# Patient Record
Sex: MALE | Race: WHITE | NOT HISPANIC OR LATINO | Employment: FULL TIME | URBAN - NONMETROPOLITAN AREA
[De-identification: names, ages, dates, MRNs, and addresses within clinical notes are randomized per-mention and may not be internally consistent; named-entity substitution may affect disease eponyms.]

---

## 2021-03-29 ENCOUNTER — APPOINTMENT (OUTPATIENT)
Dept: GENERAL RADIOLOGY | Facility: HOSPITAL | Age: 36
End: 2021-03-29

## 2021-03-29 ENCOUNTER — HOSPITAL ENCOUNTER (EMERGENCY)
Facility: HOSPITAL | Age: 36
Discharge: HOME | End: 2021-03-29
Attending: EMERGENCY MEDICINE | Admitting: EMERGENCY MEDICINE

## 2021-03-29 ENCOUNTER — APPOINTMENT (OUTPATIENT)
Dept: CT IMAGING | Facility: HOSPITAL | Age: 36
End: 2021-03-29

## 2021-03-29 VITALS
HEART RATE: 78 BPM | SYSTOLIC BLOOD PRESSURE: 155 MMHG | RESPIRATION RATE: 22 BRPM | HEIGHT: 70 IN | DIASTOLIC BLOOD PRESSURE: 86 MMHG | WEIGHT: 165 LBS | OXYGEN SATURATION: 98 % | BODY MASS INDEX: 23.62 KG/M2 | TEMPERATURE: 95 F

## 2021-03-29 DIAGNOSIS — S92.901A CLOSED FRACTURE OF RIGHT FOOT, INITIAL ENCOUNTER: Primary | ICD-10-CM

## 2021-03-29 PROCEDURE — 73610 X-RAY EXAM OF ANKLE: CPT

## 2021-03-29 PROCEDURE — 73700 CT LOWER EXTREMITY W/O DYE: CPT

## 2021-03-29 PROCEDURE — 73630 X-RAY EXAM OF FOOT: CPT

## 2021-03-29 PROCEDURE — 25010000002 TDAP 5-2.5-18.5 LF-MCG/0.5 SUSPENSION: Performed by: STUDENT IN AN ORGANIZED HEALTH CARE EDUCATION/TRAINING PROGRAM

## 2021-03-29 PROCEDURE — 90715 TDAP VACCINE 7 YRS/> IM: CPT | Performed by: STUDENT IN AN ORGANIZED HEALTH CARE EDUCATION/TRAINING PROGRAM

## 2021-03-29 PROCEDURE — 73590 X-RAY EXAM OF LOWER LEG: CPT

## 2021-03-29 PROCEDURE — 99284 EMERGENCY DEPT VISIT MOD MDM: CPT

## 2021-03-29 PROCEDURE — 90471 IMMUNIZATION ADMIN: CPT | Performed by: STUDENT IN AN ORGANIZED HEALTH CARE EDUCATION/TRAINING PROGRAM

## 2021-03-29 RX ORDER — HYDROCODONE BITARTRATE AND ACETAMINOPHEN 5; 325 MG/1; MG/1
2 TABLET ORAL EVERY 6 HOURS PRN
Qty: 15 TABLET | Refills: 0 | Status: SHIPPED | OUTPATIENT
Start: 2021-03-29 | End: 2021-04-12

## 2021-03-29 RX ORDER — SODIUM CHLORIDE 0.9 % (FLUSH) 0.9 %
10 SYRINGE (ML) INJECTION AS NEEDED
Status: CANCELLED | OUTPATIENT
Start: 2021-03-29

## 2021-03-29 RX ORDER — HYDROCODONE BITARTRATE AND ACETAMINOPHEN 10; 325 MG/1; MG/1
1 TABLET ORAL EVERY 4 HOURS PRN
Status: DISCONTINUED | OUTPATIENT
Start: 2021-03-29 | End: 2021-03-29 | Stop reason: HOSPADM

## 2021-03-29 RX ORDER — KETOROLAC TROMETHAMINE 10 MG/1
20 TABLET, FILM COATED ORAL ONCE
Status: COMPLETED | OUTPATIENT
Start: 2021-03-29 | End: 2021-03-29

## 2021-03-29 RX ORDER — HYDROCODONE BITARTRATE AND ACETAMINOPHEN 7.5; 325 MG/1; MG/1
1 TABLET ORAL ONCE
Status: COMPLETED | OUTPATIENT
Start: 2021-03-29 | End: 2021-03-29

## 2021-03-29 RX ORDER — KETOROLAC TROMETHAMINE 10 MG/1
10 TABLET, FILM COATED ORAL EVERY 6 HOURS PRN
Qty: 15 TABLET | Refills: 0 | Status: SHIPPED | OUTPATIENT
Start: 2021-03-29 | End: 2021-04-12

## 2021-03-29 RX ADMIN — KETOROLAC TROMETHAMINE 20 MG: 10 TABLET, FILM COATED ORAL at 16:12

## 2021-03-29 RX ADMIN — TETANUS TOXOID, REDUCED DIPHTHERIA TOXOID AND ACELLULAR PERTUSSIS VACCINE, ADSORBED 0.5 ML: 5; 2.5; 8; 8; 2.5 SUSPENSION INTRAMUSCULAR at 14:12

## 2021-03-29 RX ADMIN — HYDROCODONE BITARTRATE AND ACETAMINOPHEN 1 TABLET: 7.5; 325 TABLET ORAL at 13:30

## 2021-03-31 ENCOUNTER — OFFICE VISIT (OUTPATIENT)
Dept: PODIATRY | Facility: CLINIC | Age: 36
End: 2021-03-31

## 2021-03-31 VITALS — HEART RATE: 121 BPM | BODY MASS INDEX: 23.62 KG/M2 | WEIGHT: 165 LBS | OXYGEN SATURATION: 99 % | HEIGHT: 70 IN

## 2021-03-31 DIAGNOSIS — S93.324A DISLOCATION OF TARSOMETATARSAL JOINT OF RIGHT FOOT, INITIAL ENCOUNTER: Primary | ICD-10-CM

## 2021-03-31 PROCEDURE — 99204 OFFICE O/P NEW MOD 45 MIN: CPT | Performed by: PODIATRIST

## 2021-04-02 RX ORDER — HYDROCODONE BITARTRATE AND ACETAMINOPHEN 10; 325 MG/1; MG/1
1 TABLET ORAL EVERY 6 HOURS PRN
Qty: 28 TABLET | Refills: 0 | Status: SHIPPED | OUTPATIENT
Start: 2021-04-02 | End: 2021-04-12

## 2021-04-05 ENCOUNTER — OFFICE VISIT (OUTPATIENT)
Dept: PODIATRY | Facility: CLINIC | Age: 36
End: 2021-04-05

## 2021-04-05 VITALS — WEIGHT: 165 LBS | OXYGEN SATURATION: 99 % | HEIGHT: 70 IN | HEART RATE: 102 BPM | BODY MASS INDEX: 23.62 KG/M2

## 2021-04-05 DIAGNOSIS — S93.324D DISLOCATION OF TARSOMETATARSAL JOINT OF RIGHT FOOT, SUBSEQUENT ENCOUNTER: Primary | ICD-10-CM

## 2021-04-05 PROCEDURE — 99214 OFFICE O/P EST MOD 30 MIN: CPT | Performed by: PODIATRIST

## 2021-04-05 NOTE — PROGRESS NOTES
Andres Wallace  1985  35 y.o. male    04/05/2021     Chief Complaint   Patient presents with   • Right Foot - Follow-up, Dislocation       History of Present Illness    Andres Wallace is a 35 y.o.male who presents to clinic as a follow-up of his right foot injury.  States swelling is improving.  Denies any new complaints.    History reviewed. No pertinent past medical history.      Past Surgical History:   Procedure Laterality Date   • WRIST SURGERY           Family History   Problem Relation Age of Onset   • Diabetes Mother    • Cancer Maternal Grandfather    • Cancer Paternal Grandfather    • Diabetes Paternal Grandfather        No Known Allergies    Social History     Socioeconomic History   • Marital status: Single     Spouse name: Not on file   • Number of children: Not on file   • Years of education: Not on file   • Highest education level: Not on file   Tobacco Use   • Smoking status: Current Every Day Smoker   • Smokeless tobacco: Current User     Types: Snuff   Vaping Use   • Vaping Use: Never used   Substance and Sexual Activity   • Alcohol use: Yes   • Drug use: Yes     Types: Nitrous oxide   • Sexual activity: Defer         Current Outpatient Medications   Medication Sig Dispense Refill   • HYDROcodone-acetaminophen (Norco)  MG per tablet Take 1 tablet by mouth Every 6 (Six) Hours As Needed for Moderate Pain . 28 tablet 0   • ketorolac (TORADOL) 10 MG tablet Take 1 tablet by mouth Every 6 (Six) Hours As Needed for Moderate Pain  for up to 15 doses. 15 tablet 0   • HYDROcodone-acetaminophen (NORCO) 5-325 MG per tablet Take 2 tablets by mouth Every 6 (Six) Hours As Needed for Moderate Pain . 15 tablet 0     No current facility-administered medications for this visit.       Review of Systems   Constitutional: Negative.    HENT: Positive for hearing loss.    Respiratory: Negative.    Cardiovascular: Negative.    Gastrointestinal: Negative.    Musculoskeletal:        Foot pain.   Skin:  "Positive for color change and wound.   Neurological: Negative.    Psychiatric/Behavioral: Negative.          OBJECTIVE    Pulse 102   Ht 177.8 cm (70\")   Wt 74.8 kg (165 lb)   SpO2 99%   BMI 23.68 kg/m²     Physical Exam  Vitals reviewed.   Constitutional:       General: He is not in acute distress.     Appearance: He is well-developed.   HENT:      Head: Normocephalic and atraumatic.      Nose: Nose normal.   Eyes:      Conjunctiva/sclera: Conjunctivae normal.      Pupils: Pupils are equal, round, and reactive to light.   Pulmonary:      Effort: Pulmonary effort is normal. No respiratory distress.      Breath sounds: No wheezing.   Musculoskeletal:         General: Swelling, tenderness and signs of injury present. No deformity. Normal range of motion.   Skin:     General: Skin is warm and dry.      Capillary Refill: Capillary refill takes less than 2 seconds.   Neurological:      Mental Status: He is alert and oriented to person, place, and time.   Psychiatric:         Behavior: Behavior normal.         Thought Content: Thought content normal.          Right Lower Extremity Exam:     Cardiovascular:    DP/PT pulses palpable    CFT brisk  to all digits    Skin temp is warm to warm from proximal tibia to distal digits    Pedal hair growth present    Edema and ecchymosis noted diffusely about the dorsal, medial ankle plantar forefoot, improving  Musculoskeletal:  Muscle strength is deferred  ROM of the 1st MTP is WNL b/l  Global pain on palpation to right midfoot  Active flexion and extension of distal digits  ROM of the ankle joint is  WNL    Dermatological:   Webspaces 1-4  are clean, dry and intact.   No subcutaneous nodules or masses noted     Ruptured  fracture blisters noted to dorsal midfoot.  Dorsal skin is  taut, - wrinkle test, improving   Neurological:   Protective sensation intact    Sensation intact to light touch        Foot/Ankle Exam        Procedures    Study Result    Narrative & Impression   "   PROCEDURE: CT LOWER EXTREMITY WITHOUT IV CONTRAST     TECHNIQUE: Multichannel computed tomography of the right foot and  ankle without IV contrast.  Coronal and sagittal reformatted images were also obtained to  improve fracture detection.     Contrast: None     This exam was performed using radiation doses that are as low as  reasonably achievable (ALARA).  This exam was performed according to our departmental dose  optimization program, which includes automated exposure control,  adjustment of the mA and/or KV according to patient size and/or  use of iterative reconstruction technique.     COMPARISON: Right foot radiographs dated 3/29/2021.     HISTORY: Foot trauma     FINDINGS  There is widening of the Lisfranc joint consistent with  ligamentous disruption. There are comminuted fractures of the  bases of the first through fourth metatarsals consistent with  unstable injury. There is a small linear avulsion fracture  fragment which appears to be arising from the medial aspect of  the cuboid.     IMPRESSION:  CONCLUSION:   There is widening of the Lisfranc joint consistent with  ligamentous disruption. There are comminuted fractures of the  bases of the first through fourth metatarsals consistent with  unstable injury. There is a small linear avulsion fracture  fragment which appears to be arising from the medial aspect of  the cuboid.     Electronically signed by:  Randall Marques MD  3/29/2021 4:04 PM CDT  Workstation: [id number]         ASSESSMENT AND PLAN    Diagnoses and all orders for this visit:    1. Dislocation of tarsometatarsal joint of right foot, subsequent encounter (Primary)  -     Case Request; Standing  -     ceFAZolin (ANCEF) 2 g in sodium chloride 0.9 % 100 mL IVPB  -     Case Request    Other orders  -     Follow Anesthesia Guidelines / Standing Orders; Standing  -     Verify NPO Status; Standing  -     Obtain informed consent (if not collected inpatient or PAT); Standing  -     Notify  Physician - Standard; Standing  -     Follow Anesthesia Guidelines / Standing Orders; Future          -Soft tissue envelope is improving.  Reapplied Manzo compression splint.  We will tentatively plan for open reduction and internal fixation of right Lisfranc fracture dislocation on April 15.  -Surgical plan is for open reduction and internal fixation of right Lisfranc fracture dislocation.  -Risks and benefits of the procedure including but not limited to postoperative infection, incisional dehiscence, numbness, swelling, residual pain and postoperative blood clot discussed in detail.  No guarantees were given or implied.  -Tentative date for the surgery April 15  -All questions were answered  -Recheck following surgery           This document has been electronically signed by Golden Maki DPM on April 9, 2021 09:52 CDT     4/9/2021  09:52 CDT

## 2021-04-06 ENCOUNTER — TELEPHONE (OUTPATIENT)
Dept: PODIATRY | Facility: CLINIC | Age: 36
End: 2021-04-06

## 2021-04-06 NOTE — TELEPHONE ENCOUNTER
PT REQUESTS A CALL BACK RE NEEDS A WORK SLIP FOR [employer] COMP.  CALL BACK # [phone].  THANK YOU.

## 2021-04-06 NOTE — TELEPHONE ENCOUNTER
Patient request  A work excuse that he is to remain out of work.  He will need one for each visit from 4/5/2021 going forward  Fax # [phone]

## 2021-04-06 NOTE — TELEPHONE ENCOUNTER
This has been taken care of; I also sent a statement that it is the patient's responsibility to request a note at each appointment and that per our office guidelines work notes are not to be faxed. This is the one and only time this will be allowed.  Sorry for the inconvenience.  SJ

## 2021-04-07 PROBLEM — S93.324A DISLOCATION OF TARSOMETATARSAL JOINT OF RIGHT FOOT: Status: ACTIVE | Noted: 2021-04-07

## 2021-04-07 RX ORDER — BUPIVACAINE HCL/0.9 % NACL/PF 0.1 %
2 PLASTIC BAG, INJECTION (ML) EPIDURAL ONCE
Status: CANCELLED | OUTPATIENT
Start: 2021-04-15 | End: 2021-04-07

## 2021-04-09 ENCOUNTER — TELEPHONE (OUTPATIENT)
Dept: PODIATRY | Facility: CLINIC | Age: 36
End: 2021-04-09

## 2021-04-09 DIAGNOSIS — S93.324D DISLOCATION OF TARSOMETATARSAL JOINT OF RIGHT FOOT, SUBSEQUENT ENCOUNTER: Primary | ICD-10-CM

## 2021-04-09 RX ORDER — HYDROCODONE BITARTRATE AND ACETAMINOPHEN 10; 325 MG/1; MG/1
1 TABLET ORAL EVERY 6 HOURS PRN
Qty: 28 TABLET | Refills: 0 | Status: SHIPPED | OUTPATIENT
Start: 2021-04-09 | End: 2021-04-15 | Stop reason: HOSPADM

## 2021-04-09 NOTE — TELEPHONE ENCOUNTER
Andres Wallace     Last seen in office : 4/5/2021    Dislocation tarsometatarsal joint right foot     Surgery Day 4/15/2021    Last medication Norco 4/2/2021

## 2021-04-12 ENCOUNTER — LAB (OUTPATIENT)
Dept: LAB | Facility: HOSPITAL | Age: 36
End: 2021-04-12

## 2021-04-12 ENCOUNTER — PRE-ADMISSION TESTING (OUTPATIENT)
Dept: PREADMISSION TESTING | Facility: HOSPITAL | Age: 36
End: 2021-04-12

## 2021-04-12 ENCOUNTER — OFFICE VISIT (OUTPATIENT)
Dept: PODIATRY | Facility: CLINIC | Age: 36
End: 2021-04-12

## 2021-04-12 VITALS — BODY MASS INDEX: 22.19 KG/M2 | HEART RATE: 114 BPM | OXYGEN SATURATION: 98 % | WEIGHT: 155 LBS | HEIGHT: 70 IN

## 2021-04-12 VITALS
HEART RATE: 102 BPM | RESPIRATION RATE: 18 BRPM | OXYGEN SATURATION: 97 % | BODY MASS INDEX: 22.19 KG/M2 | WEIGHT: 155 LBS | DIASTOLIC BLOOD PRESSURE: 82 MMHG | HEIGHT: 70 IN | SYSTOLIC BLOOD PRESSURE: 150 MMHG

## 2021-04-12 DIAGNOSIS — S93.324D DISLOCATION OF TARSOMETATARSAL JOINT OF RIGHT FOOT, SUBSEQUENT ENCOUNTER: Primary | ICD-10-CM

## 2021-04-12 DIAGNOSIS — Z01.818 PREOP TESTING: Primary | ICD-10-CM

## 2021-04-12 LAB
MRSA DNA SPEC QL NAA+PROBE: NEGATIVE
SARS-COV-2 N GENE RESP QL NAA+PROBE: NOT DETECTED

## 2021-04-12 PROCEDURE — C9803 HOPD COVID-19 SPEC COLLECT: HCPCS

## 2021-04-12 PROCEDURE — 87635 SARS-COV-2 COVID-19 AMP PRB: CPT

## 2021-04-12 PROCEDURE — 99212 OFFICE O/P EST SF 10 MIN: CPT | Performed by: PODIATRIST

## 2021-04-12 PROCEDURE — 87641 MR-STAPH DNA AMP PROBE: CPT

## 2021-04-12 RX ORDER — SODIUM CHLORIDE, SODIUM GLUCONATE, SODIUM ACETATE, POTASSIUM CHLORIDE AND MAGNESIUM CHLORIDE 526; 502; 368; 37; 30 MG/100ML; MG/100ML; MG/100ML; MG/100ML; MG/100ML
1000 INJECTION, SOLUTION INTRAVENOUS CONTINUOUS PRN
Status: CANCELLED | OUTPATIENT
Start: 2021-04-15

## 2021-04-12 NOTE — PROGRESS NOTES
"Andres Wallace  1985  35 y.o. male     04/12/2021     Chief Complaint   Patient presents with   • Right Foot - Follow-up, Pain       History of Present Illness    Andres Wallace is a 35 y.o.male who presents to clinic as a follow-up of his right foot injury.     History reviewed. No pertinent past medical history.      Past Surgical History:   Procedure Laterality Date   • ORIF WRIST FRACTURE Left    • OTHER SURGICAL HISTORY      gastric surgery secondary to perforated ulcer         Family History   Problem Relation Age of Onset   • Diabetes Mother    • Cancer Maternal Grandfather    • Cancer Paternal Grandfather    • Diabetes Paternal Grandfather        No Known Allergies    Social History     Socioeconomic History   • Marital status:      Spouse name: Not on file   • Number of children: Not on file   • Years of education: Not on file   • Highest education level: Not on file   Tobacco Use   • Smoking status: Current Every Day Smoker   • Smokeless tobacco: Current User     Types: Snuff   Vaping Use   • Vaping Use: Never used   Substance and Sexual Activity   • Alcohol use: Yes     Comment: socially   • Drug use: Not Currently     Types: Nitrous oxide   • Sexual activity: Defer         Current Outpatient Medications   Medication Sig Dispense Refill   • HYDROcodone-acetaminophen (Norco)  MG per tablet Take 1 tablet by mouth Every 6 (Six) Hours As Needed for Moderate Pain . 28 tablet 0     No current facility-administered medications for this visit.       Review of Systems   Constitutional: Negative.    HENT: Positive for hearing loss.    Respiratory: Negative.    Cardiovascular: Negative.    Gastrointestinal: Negative.    Endocrine: Negative.    Genitourinary: Negative.    Musculoskeletal:        Right foot pain     Skin: Positive for color change and wound.   Neurological: Negative.    Psychiatric/Behavioral: Negative.          OBJECTIVE    Pulse 114   Ht 177.8 cm (70\")   Wt 70.3 kg " (155 lb)   SpO2 98%   BMI 22.24 kg/m²     Physical Exam  Vitals reviewed.   Constitutional:       General: He is not in acute distress.     Appearance: He is well-developed.   HENT:      Head: Normocephalic and atraumatic.      Nose: Nose normal.   Eyes:      Conjunctiva/sclera: Conjunctivae normal.      Pupils: Pupils are equal, round, and reactive to light.   Pulmonary:      Effort: Pulmonary effort is normal. No respiratory distress.      Breath sounds: No wheezing.   Musculoskeletal:         General: Swelling, tenderness and signs of injury present. No deformity. Normal range of motion.   Skin:     General: Skin is warm and dry.      Capillary Refill: Capillary refill takes less than 2 seconds.   Neurological:      Mental Status: He is alert and oriented to person, place, and time.   Psychiatric:         Behavior: Behavior normal.         Thought Content: Thought content normal.          Right Lower Extremity Exam:     Cardiovascular:    DP/PT pulses palpable    CFT brisk  to all digits    Skin temp is warm to warm from proximal tibia to distal digits    Pedal hair growth present    Edema and ecchymosis noted diffusely about the dorsal, medial ankle plantar forefoot, improving  Musculoskeletal:  Muscle strength is deferred  ROM of the 1st MTP is WNL b/l  Global pain on palpation to right midfoot  Active flexion and extension of distal digits  ROM of the ankle joint is  WNL    Dermatological:   Webspaces 1-4  are clean, dry and intact.   No subcutaneous nodules or masses noted     Ruptured  fracture blisters noted to dorsal midfoot.  Dorsal skin is  taut, - wrinkle test, improving   Neurological:   Protective sensation intact    Sensation intact to light touch        Foot/Ankle Exam        Procedures    Study Result    Narrative & Impression   PROCEDURE: CT LOWER EXTREMITY WITHOUT IV CONTRAST     TECHNIQUE: Multichannel computed tomography of the right foot and  ankle without IV contrast.  Coronal and sagittal  reformatted images were also obtained to  improve fracture detection.     Contrast: None     This exam was performed using radiation doses that are as low as  reasonably achievable (ALARA).  This exam was performed according to our departmental dose  optimization program, which includes automated exposure control,  adjustment of the mA and/or KV according to patient size and/or  use of iterative reconstruction technique.     COMPARISON: Right foot radiographs dated 3/29/2021.     HISTORY: Foot trauma     FINDINGS  There is widening of the Lisfranc joint consistent with  ligamentous disruption. There are comminuted fractures of the  bases of the first through fourth metatarsals consistent with  unstable injury. There is a small linear avulsion fracture  fragment which appears to be arising from the medial aspect of  the cuboid.     IMPRESSION:  CONCLUSION:   There is widening of the Lisfranc joint consistent with  ligamentous disruption. There are comminuted fractures of the  bases of the first through fourth metatarsals consistent with  unstable injury. There is a small linear avulsion fracture  fragment which appears to be arising from the medial aspect of  the cuboid.     Electronically signed by:  Randall Marques MD  3/29/2021 4:04 PM CDT  Workstation: [id number]         ASSESSMENT AND PLAN    Diagnoses and all orders for this visit:    1. Dislocation of tarsometatarsal joint of right foot, subsequent encounter (Primary)          -Soft tissue envelope has a significantly improved.  Unna boot applied today  -Tentative plan for open reduction internal fixation of right foot Lisfranc injury on April 15.  Risks and benefits of the surgery were discussed in detail.  No guarantees were given or implied.  -All questions were answered  -Recheck following surgery           This document has been electronically signed by Golden Maki DPM on April 12, 2021 12:10 CDT     4/12/2021  12:10 CDT

## 2021-04-12 NOTE — DISCHARGE INSTRUCTIONS
[facility]  Pre-op Information and Guidelines    You will be called after 2 p.m. the day before your surgery (Friday for Monday surgery) and notified of your time for arrival and approximate surgery time.  If you have not received a call by 4P.M., please contact Same Day Surgery at [phone] of if outside [facility] call [phone].    Please Follow these Important Safety Guidelines:    • The morning of your procedure, take only the medications listed below with   A sip of water:_____________________________________________       ______________________________________________    • DO NOT eat or drink anything after 12:00 midnight the night before surgery  Specific instructions concerning drinking clear liquids will be discussed during  the pre-surgery instruction call the day before your surgery.    • If you take a blood thinner (ex. Plavix, Coumadin, aspirin), ask your doctor when to stop it before surgery  STOP DATE: _________________    • Only 2 visitors are allowed in patient rooms at a time  Your visitors will be asked to wait in the lobby until the admission process is complete with the exception of a parent with a child and patients in need of special assistance.    • YOU CANNOT DRIVE YOURSELF HOME  You must be accompanied by someone who will be responsible for driving you home after surgery and for your care at home.    • DO NOT chew gum, use breath mints, hard candy, or smoke the day of surgery  • DO NOT drink alcohol for at least 24 hours before your surgery  • DO NOT wear any jewelry and remove all body piercing before coming to the hospital  • DO NOT wear make-up to the hospital  • If you are having surgery on an extremity (arm/leg/foot) remove nail polish/artificial nails on the surgical side  • Clothing, glasses, contacts, dentures, and hairpieces must be removed before surgery  • Bathe the night before or the morning of your surgery and do not use powders/lotions on  skin.

## 2021-04-14 ENCOUNTER — ANESTHESIA EVENT (OUTPATIENT)
Dept: PERIOP | Facility: HOSPITAL | Age: 36
End: 2021-04-14

## 2021-04-15 ENCOUNTER — ANESTHESIA (OUTPATIENT)
Dept: PERIOP | Facility: HOSPITAL | Age: 36
End: 2021-04-15

## 2021-04-15 ENCOUNTER — HOSPITAL ENCOUNTER (OUTPATIENT)
Facility: HOSPITAL | Age: 36
Setting detail: HOSPITAL OUTPATIENT SURGERY
Discharge: HOME | End: 2021-04-15
Attending: PODIATRIST | Admitting: PODIATRIST

## 2021-04-15 ENCOUNTER — APPOINTMENT (OUTPATIENT)
Dept: GENERAL RADIOLOGY | Facility: HOSPITAL | Age: 36
End: 2021-04-15

## 2021-04-15 VITALS
HEART RATE: 81 BPM | OXYGEN SATURATION: 99 % | DIASTOLIC BLOOD PRESSURE: 76 MMHG | RESPIRATION RATE: 18 BRPM | BODY MASS INDEX: 21.87 KG/M2 | HEIGHT: 70 IN | SYSTOLIC BLOOD PRESSURE: 133 MMHG | WEIGHT: 152.78 LBS | TEMPERATURE: 97.3 F

## 2021-04-15 DIAGNOSIS — S93.324D DISLOCATION OF TARSOMETATARSAL JOINT OF RIGHT FOOT, SUBSEQUENT ENCOUNTER: ICD-10-CM

## 2021-04-15 LAB
AMPHET+METHAMPHET UR QL: NEGATIVE
AMPHETAMINES UR QL: NEGATIVE
BARBITURATES UR QL SCN: NEGATIVE
BENZODIAZ UR QL SCN: NEGATIVE
BUPRENORPHINE SERPL-MCNC: NEGATIVE NG/ML
CANNABINOIDS SERPL QL: NEGATIVE
COCAINE UR QL: NEGATIVE
METHADONE UR QL SCN: NEGATIVE
OPIATES UR QL: POSITIVE
OXYCODONE UR QL SCN: NEGATIVE
PCP UR QL SCN: NEGATIVE
PROPOXYPH UR QL: NEGATIVE
TRICYCLICS UR QL SCN: NEGATIVE

## 2021-04-15 PROCEDURE — 76000 FLUOROSCOPY <1 HR PHYS/QHP: CPT

## 2021-04-15 PROCEDURE — 25010000002 CEFAZOLIN PER 500 MG: Performed by: PODIATRIST

## 2021-04-15 PROCEDURE — 25010000002 FENTANYL CITRATE (PF) 100 MCG/2ML SOLUTION: Performed by: NURSE ANESTHETIST, CERTIFIED REGISTERED

## 2021-04-15 PROCEDURE — 28485 OPTX METATARSAL FX EACH: CPT | Performed by: PODIATRIST

## 2021-04-15 PROCEDURE — C1713 ANCHOR/SCREW BN/BN,TIS/BN: HCPCS | Performed by: PODIATRIST

## 2021-04-15 PROCEDURE — 25010000002 PROPOFOL 10 MG/ML EMULSION: Performed by: NURSE ANESTHETIST, CERTIFIED REGISTERED

## 2021-04-15 PROCEDURE — 25010000002 MIDAZOLAM PER 1 MG: Performed by: NURSE ANESTHETIST, CERTIFIED REGISTERED

## 2021-04-15 PROCEDURE — 80306 DRUG TEST PRSMV INSTRMNT: CPT | Performed by: ANESTHESIOLOGY

## 2021-04-15 PROCEDURE — 25010000002 ONDANSETRON PER 1 MG: Performed by: NURSE ANESTHETIST, CERTIFIED REGISTERED

## 2021-04-15 PROCEDURE — 28615 REPAIR FOOT DISLOCATION: CPT | Performed by: PODIATRIST

## 2021-04-15 PROCEDURE — 25010000002 HYDROMORPHONE PER 4 MG: Performed by: NURSE ANESTHETIST, CERTIFIED REGISTERED

## 2021-04-15 PROCEDURE — 28555 REPAIR FOOT DISLOCATION: CPT | Performed by: PODIATRIST

## 2021-04-15 PROCEDURE — C1769 GUIDE WIRE: HCPCS | Performed by: PODIATRIST

## 2021-04-15 PROCEDURE — 25010000002 DEXAMETHASONE PER 1 MG: Performed by: NURSE ANESTHETIST, CERTIFIED REGISTERED

## 2021-04-15 PROCEDURE — 25010000002 ROPIVACAINE PER 1 MG: Performed by: PODIATRIST

## 2021-04-15 DEVICE — SCRW CANN SHRT THRD 1/3 4X32MM: Type: IMPLANTABLE DEVICE | Site: FOOT | Status: FUNCTIONAL

## 2021-04-15 DEVICE — IMPLANTABLE DEVICE: Type: IMPLANTABLE DEVICE | Site: FOOT | Status: FUNCTIONAL

## 2021-04-15 DEVICE — SCRW CANN SHRT THRD 1/3 4X40MM: Type: IMPLANTABLE DEVICE | Site: FOOT | Status: FUNCTIONAL

## 2021-04-15 RX ORDER — DEXAMETHASONE SODIUM PHOSPHATE 4 MG/ML
INJECTION, SOLUTION INTRA-ARTICULAR; INTRALESIONAL; INTRAMUSCULAR; INTRAVENOUS; SOFT TISSUE AS NEEDED
Status: DISCONTINUED | OUTPATIENT
Start: 2021-04-15 | End: 2021-04-15 | Stop reason: SURG

## 2021-04-15 RX ORDER — FENTANYL CITRATE 50 UG/ML
INJECTION, SOLUTION INTRAMUSCULAR; INTRAVENOUS AS NEEDED
Status: DISCONTINUED | OUTPATIENT
Start: 2021-04-15 | End: 2021-04-15 | Stop reason: SURG

## 2021-04-15 RX ORDER — SODIUM CHLORIDE, SODIUM GLUCONATE, SODIUM ACETATE, POTASSIUM CHLORIDE AND MAGNESIUM CHLORIDE 526; 502; 368; 37; 30 MG/100ML; MG/100ML; MG/100ML; MG/100ML; MG/100ML
1000 INJECTION, SOLUTION INTRAVENOUS CONTINUOUS PRN
Status: DISCONTINUED | OUTPATIENT
Start: 2021-04-15 | End: 2021-04-15 | Stop reason: HOSPADM

## 2021-04-15 RX ORDER — ROPIVACAINE HYDROCHLORIDE 5 MG/ML
INJECTION, SOLUTION EPIDURAL; INFILTRATION; PERINEURAL
Status: COMPLETED | OUTPATIENT
Start: 2021-04-15 | End: 2021-04-15

## 2021-04-15 RX ORDER — OXYCODONE AND ACETAMINOPHEN 7.5; 325 MG/1; MG/1
1 TABLET ORAL EVERY 4 HOURS PRN
Qty: 30 TABLET | Refills: 0 | Status: SHIPPED | OUTPATIENT
Start: 2021-04-15 | End: 2021-04-19 | Stop reason: SDUPTHER

## 2021-04-15 RX ORDER — BUPIVACAINE HCL/0.9 % NACL/PF 0.1 %
2 PLASTIC BAG, INJECTION (ML) EPIDURAL ONCE
Status: COMPLETED | OUTPATIENT
Start: 2021-04-15 | End: 2021-04-15

## 2021-04-15 RX ORDER — BACITRACIN ZINC 500 [USP'U]/G
OINTMENT TOPICAL AS NEEDED
Status: DISCONTINUED | OUTPATIENT
Start: 2021-04-15 | End: 2021-04-15 | Stop reason: HOSPADM

## 2021-04-15 RX ORDER — ONDANSETRON 2 MG/ML
INJECTION INTRAMUSCULAR; INTRAVENOUS AS NEEDED
Status: DISCONTINUED | OUTPATIENT
Start: 2021-04-15 | End: 2021-04-15 | Stop reason: SURG

## 2021-04-15 RX ORDER — HYDROMORPHONE HCL 110MG/55ML
PATIENT CONTROLLED ANALGESIA SYRINGE INTRAVENOUS AS NEEDED
Status: DISCONTINUED | OUTPATIENT
Start: 2021-04-15 | End: 2021-04-15 | Stop reason: SURG

## 2021-04-15 RX ORDER — PROPOFOL 10 MG/ML
VIAL (ML) INTRAVENOUS AS NEEDED
Status: DISCONTINUED | OUTPATIENT
Start: 2021-04-15 | End: 2021-04-15 | Stop reason: SURG

## 2021-04-15 RX ORDER — MEPERIDINE HYDROCHLORIDE 25 MG/ML
12.5 INJECTION INTRAMUSCULAR; INTRAVENOUS; SUBCUTANEOUS
Status: DISCONTINUED | OUTPATIENT
Start: 2021-04-15 | End: 2021-04-15 | Stop reason: HOSPADM

## 2021-04-15 RX ORDER — MIDAZOLAM HYDROCHLORIDE 1 MG/ML
INJECTION INTRAMUSCULAR; INTRAVENOUS AS NEEDED
Status: DISCONTINUED | OUTPATIENT
Start: 2021-04-15 | End: 2021-04-15 | Stop reason: SURG

## 2021-04-15 RX ORDER — LIDOCAINE HYDROCHLORIDE 20 MG/ML
INJECTION, SOLUTION INFILTRATION; PERINEURAL AS NEEDED
Status: DISCONTINUED | OUTPATIENT
Start: 2021-04-15 | End: 2021-04-15 | Stop reason: SURG

## 2021-04-15 RX ADMIN — FENTANYL CITRATE 100 MCG: 50 INJECTION INTRAMUSCULAR; INTRAVENOUS at 13:01

## 2021-04-15 RX ADMIN — ROPIVACAINE HYDROCHLORIDE 15 ML: 5 INJECTION, SOLUTION EPIDURAL; INFILTRATION; PERINEURAL at 12:08

## 2021-04-15 RX ADMIN — ROPIVACAINE HYDROCHLORIDE 15 ML: 5 INJECTION, SOLUTION EPIDURAL; INFILTRATION; PERINEURAL at 11:57

## 2021-04-15 RX ADMIN — ONDANSETRON HYDROCHLORIDE 4 MG: 2 INJECTION INTRAMUSCULAR; INTRAVENOUS at 15:03

## 2021-04-15 RX ADMIN — Medication 2 G: at 13:08

## 2021-04-15 RX ADMIN — PROPOFOL 200 MG: 10 INJECTION, EMULSION INTRAVENOUS at 13:01

## 2021-04-15 RX ADMIN — LIDOCAINE HYDROCHLORIDE 80 MG: 20 INJECTION, SOLUTION INFILTRATION; PERINEURAL at 13:01

## 2021-04-15 RX ADMIN — SODIUM CHLORIDE, SODIUM GLUCONATE, SODIUM ACETATE, POTASSIUM CHLORIDE AND MAGNESIUM CHLORIDE 1000 ML: 526; 502; 368; 37; 30 INJECTION, SOLUTION INTRAVENOUS at 09:40

## 2021-04-15 RX ADMIN — DEXAMETHASONE SODIUM PHOSPHATE 4 MG: 4 INJECTION, SOLUTION INTRAMUSCULAR; INTRAVENOUS at 13:16

## 2021-04-15 RX ADMIN — HYDROMORPHONE HYDROCHLORIDE 1 MG: 2 INJECTION, SOLUTION INTRAMUSCULAR; INTRAVENOUS; SUBCUTANEOUS at 15:04

## 2021-04-15 RX ADMIN — SODIUM CHLORIDE, SODIUM GLUCONATE, SODIUM ACETATE, POTASSIUM CHLORIDE AND MAGNESIUM CHLORIDE: 526; 502; 368; 37; 30 INJECTION, SOLUTION INTRAVENOUS at 13:20

## 2021-04-15 RX ADMIN — MIDAZOLAM HYDROCHLORIDE 2 MG: 2 INJECTION, SOLUTION INTRAMUSCULAR; INTRAVENOUS at 12:55

## 2021-04-15 NOTE — ANESTHESIA PREPROCEDURE EVALUATION
Anesthesia Evaluation     Patient summary reviewed and Nursing notes reviewed   no history of anesthetic complications:  NPO Solid Status: > 8 hours  NPO Liquid Status: > 8 hours           Airway   Mallampati: II  TM distance: >3 FB  Neck ROM: full  possible difficult intubation  Comment: Full trimmed beard.  Dental    (+) poor dentation    Comment: Right upper central incisor crown.    Pulmonary - normal exam    breath sounds clear to auscultation  (+) a smoker (1 ppd) Current Abstained day of surgery,   (-) COPD, asthma, sleep apnea  Cardiovascular - negative cardio ROS and normal exam  Exercise tolerance: good (4-7 METS)    Rhythm: regular  Rate: normal    (-) hypertension, valvular problems/murmurs, angina, murmur, DVT, hyperlipidemia      Neuro/Psych  (+) numbness (some tingling in the right foot),     (-) seizures, TIA, CVA, headaches, psychiatric history  GI/Hepatic/Renal/Endo    (-) GERD, hepatitis, liver disease, no renal disease, diabetes    Musculoskeletal     (+) radiculopathy (sciatica on right) Right lower extremity      ROS comment: Work related injury to his right foot.  Abdominal    Substance History   (+) alcohol use (socially),   (-) drug use     OB/GYN negative ob/gyn ROS         Other   arthritis (wrist),      (-) history of cancer  ROS/Med Hx Other: Pain 6/10                Anesthesia Plan    ASA 2     general with block   (Discussed peripheral nerve block(popliteal and adductor canal) for post op pain relief and patient understands possible complications,risks and agrees.)  intravenous induction     Anesthetic plan, all risks, benefits, and alternatives have been provided, discussed and informed consent has been obtained with: patient and mother.

## 2021-04-15 NOTE — ANESTHESIA PROCEDURE NOTES
Peripheral Block      Patient reassessed immediately prior to procedure    Patient location during procedure: pre-op  Start time: 4/15/2021 11:51 AM  Stop time: 4/15/2021 11:58 AM  Performed by  CRNA: Tiffanie Silver SRNA  Assisted by: Noam Munoz MD  Preanesthetic Checklist  Completed: patient identified, IV checked, site marked, risks and benefits discussed, surgical consent, monitors and equipment checked, pre-op evaluation and timeout performed  Prep:  Pt Position: supine  Sterile barriers:cap, gloves, mask and washed/disinfected hands  Prep: ChloraPrep  Patient monitoring: continuous pulse oximetry and blood pressure monitoring  Procedure  Sedation:no  Performed under: local infiltration  Guidance:ultrasound guided  ULTRASOUND INTERPRETATION.  Using ultrasound guidance a 21 G gauge needle was placed in close proximity to the femoral nerve, at which point, under ultrasound guidance anesthetic was injected in the area of the nerve and spread of the anesthesia was seen on ultrasound in close proximity thereto.  There were no abnormalities seen on ultrasound; a digital image was taken; and the patient tolerated the procedure with no complications. Images:still images obtained, printed/placed on chart    Laterality:right  Block Type:adductor canal block  Injection Technique:single-shot  Needle Type:echogenic  Needle Gauge:21 G      Medications Used: ropivacaine (NAROPIN) 0.5 % injection, 15 mL  Med admintered at 4/15/2021 11:57 AM      Medications  Comment:Pt identified, ultrasound guided, needle seen throughout, local infiltration appropriate    Post Assessment  Injection Assessment: negative aspiration for heme, no paresthesia on injection and incremental injection  Patient Tolerance:comfortable throughout block  Complications:no

## 2021-04-15 NOTE — ANESTHESIA PROCEDURE NOTES
Airway  Urgency: elective    Date/Time: 4/15/2021 1:02 PM  Airway not difficult    General Information and Staff    Patient location during procedure: OR  CRNA: Viral Jacobo CRNA    Indications and Patient Condition  Indications for airway management: airway protection    Preoxygenated: yes  Mask difficulty assessment: 1 - vent by mask    Final Airway Details  Final airway type: supraglottic airway      Successful airway: I-gel  Size 4    Cormack-Lehane Classification: grade IIa - partial view of glottis  Number of attempts at approach: 1  Assessment: lips, teeth, and gum same as pre-op    Additional Comments  Teeth checked after intubation.  No damage noted.

## 2021-04-15 NOTE — ANESTHESIA PROCEDURE NOTES
Peripheral Block      Patient reassessed immediately prior to procedure    Patient location during procedure: holding area  Start time: 4/15/2021 12:00 PM  Stop time: 4/15/2021 12:09 PM  Reason for block: at surgeon's request and post-op pain management  Performed by  CRNA: Tiffanie Silver SRNA  Assisted by: Noam Munoz MD  Preanesthetic Checklist  Completed: patient identified, IV checked, site marked, risks and benefits discussed, surgical consent, monitors and equipment checked, pre-op evaluation and timeout performed  Prep:  Pt Position: left lateral decubitus  Sterile barriers:cap, gloves, mask and washed/disinfected hands  Prep: ChloraPrep  Patient monitoring: blood pressure monitoring and continuous pulse oximetry  Procedure  Sedation:no  Performed under: local infiltration  Guidance:ultrasound guided  ULTRASOUND INTERPRETATION.  Using ultrasound guidance a 21 G gauge needle was placed in close proximity to the femoral nerve, at which point, under ultrasound guidance anesthetic was injected in the area of the nerve and spread of the anesthesia was seen on ultrasound in close proximity thereto.  There were no abnormalities seen on ultrasound; a digital image was taken; and the patient tolerated the procedure with no complications. Images:still images obtained, printed/placed on chart    Laterality:right  Block Type:popliteal  Injection Technique:single-shot  Needle Type:echogenic  Needle Gauge:21 G      Medications Used: ropivacaine (NAROPIN) 0.5 % injection, 15 mL  Med admintered at 4/15/2021 12:08 PM      Medications  Comment:Pt identified, ultrasound guided, needle seen throughout, local infiltrate appropriate    Post Assessment  Injection Assessment: negative aspiration for heme, no paresthesia on injection and incremental injection  Patient Tolerance:comfortable throughout block  Complications:no

## 2021-04-15 NOTE — ANESTHESIA POSTPROCEDURE EVALUATION
Patient: Andres Wallace    Procedure Summary     Date: 04/15/21 Room / Location: [facility] OR  / [facility] OR    Anesthesia Start: 1255 Anesthesia Stop: 1509    Procedure: OPEN REDUCTION AND INTERNAL FIXATION OF RIGHT LISFRANC FRACTURE DISLOCATION (Right ) Diagnosis:       Dislocation of tarsometatarsal joint of right foot, subsequent encounter      (Dislocation of tarsometatarsal joint of right foot, subsequent encounter [S93.324D])    Surgeons: Golden Maki DPM Provider: Noam Munoz MD    Anesthesia Type: general ASA Status: 2          Anesthesia Type: general    Vitals  No vitals data found for the desired time range.          Post Anesthesia Care and Evaluation    Patient location during evaluation: bedside  Patient participation: complete - patient cannot participate  Level of consciousness: awake  Pain score: 0  Pain management: adequate  Airway patency: patent  Anesthetic complications: No anesthetic complications  PONV Status: none  Cardiovascular status: acceptable  Respiratory status: acceptable  Hydration status: acceptable

## 2021-04-19 ENCOUNTER — OFFICE VISIT (OUTPATIENT)
Dept: PODIATRY | Facility: CLINIC | Age: 36
End: 2021-04-19

## 2021-04-19 VITALS — WEIGHT: 152 LBS | HEART RATE: 113 BPM | HEIGHT: 70 IN | BODY MASS INDEX: 21.76 KG/M2 | OXYGEN SATURATION: 98 %

## 2021-04-19 DIAGNOSIS — S93.324D DISLOCATION OF TARSOMETATARSAL JOINT OF RIGHT FOOT, SUBSEQUENT ENCOUNTER: Primary | ICD-10-CM

## 2021-04-19 PROCEDURE — 29515 APPLICATION SHORT LEG SPLINT: CPT | Performed by: PODIATRIST

## 2021-04-19 PROCEDURE — 99024 POSTOP FOLLOW-UP VISIT: CPT | Performed by: PODIATRIST

## 2021-04-19 RX ORDER — OXYCODONE AND ACETAMINOPHEN 7.5; 325 MG/1; MG/1
1 TABLET ORAL EVERY 6 HOURS PRN
Qty: 28 TABLET | Refills: 0 | Status: SHIPPED | OUTPATIENT
Start: 2021-04-19 | End: 2021-09-20

## 2021-04-19 NOTE — PROGRESS NOTES
"Andres Wallace  1985  35 y.o. male     04/19/2021     Chief Complaint   Patient presents with   • Right Foot - post op recheck, Pain       History of Present Illness    Andres Wallace is a 35 y.o.male who presents to clinic for his first postoperative visit.  His splint is clean, dry and intact.    History reviewed. No pertinent past medical history.      Past Surgical History:   Procedure Laterality Date   • ORIF WRIST FRACTURE Left    • OTHER SURGICAL HISTORY      gastric surgery secondary to perforated ulcer         Family History   Problem Relation Age of Onset   • Diabetes Mother    • Cancer Maternal Grandfather    • Cancer Paternal Grandfather    • Diabetes Paternal Grandfather        No Known Allergies    Social History     Socioeconomic History   • Marital status:      Spouse name: Not on file   • Number of children: Not on file   • Years of education: Not on file   • Highest education level: Not on file   Tobacco Use   • Smoking status: Current Every Day Smoker     Packs/day: 1.00     Years: 19.00     Pack years: 19.00   • Smokeless tobacco: Current User     Types: Snuff   Vaping Use   • Vaping Use: Never used   Substance and Sexual Activity   • Alcohol use: Yes     Comment: socially   • Drug use: Not Currently   • Sexual activity: Defer         Current Outpatient Medications   Medication Sig Dispense Refill   • oxyCODONE-acetaminophen (Percocet) 7.5-325 MG per tablet Take 1 tablet by mouth Every 6 (Six) Hours As Needed for Moderate Pain . 28 tablet 0     No current facility-administered medications for this visit.       Review of Systems   Constitutional: Negative.    Respiratory: Negative.    Cardiovascular: Negative.    Gastrointestinal: Negative.    Endocrine: Negative.    Musculoskeletal:        Right foot pain     Psychiatric/Behavioral: Negative.          OBJECTIVE    Pulse 113   Ht 177.8 cm (70\")   Wt 68.9 kg (152 lb)   SpO2 98%   BMI 21.81 kg/m²     Physical Exam  Vitals " reviewed.   Constitutional:       General: He is not in acute distress.     Appearance: He is well-developed.   HENT:      Head: Normocephalic and atraumatic.      Nose: Nose normal.   Eyes:      Conjunctiva/sclera: Conjunctivae normal.      Pupils: Pupils are equal, round, and reactive to light.   Pulmonary:      Effort: Pulmonary effort is normal. No respiratory distress.      Breath sounds: No wheezing.   Musculoskeletal:         General: Swelling present.   Skin:     General: Skin is warm.      Capillary Refill: Capillary refill takes less than 2 seconds.      Findings: Bruising present.   Neurological:      Mental Status: He is alert and oriented to person, place, and time.   Psychiatric:         Behavior: Behavior normal.         Thought Content: Thought content normal.          Right Lower Extremity Exam: Incision sites well approximated.  No signs of dehiscence.  Moderate edema and ecchymosis.  CFT immediate to distal digits.  Sensation intact to light touch.      Foot/Ankle Exam        Procedures    ASSESSMENT AND PLAN    Diagnoses and all orders for this visit:    1. Dislocation of tarsometatarsal joint of right foot, subsequent encounter (Primary)  -     oxyCODONE-acetaminophen (Percocet) 7.5-325 MG per tablet; Take 1 tablet by mouth Every 6 (Six) Hours As Needed for Moderate Pain .  Dispense: 28 tablet; Refill: 0          -Patient doing well postoperatively.  Dressing change performed.  Splint applied.  Keep clean, dry and intact.  Nonweightbearing right lower extremity.  Refilled pain medicine.  Recheck 1 week.          This document has been electronically signed by Golden Maki DPM on April 19, 2021 10:51 CDT     4/19/2021  10:51 CDT

## 2021-04-28 ENCOUNTER — OFFICE VISIT (OUTPATIENT)
Dept: PODIATRY | Facility: CLINIC | Age: 36
End: 2021-04-28

## 2021-04-28 VITALS — HEIGHT: 70 IN | BODY MASS INDEX: 21.76 KG/M2 | OXYGEN SATURATION: 99 % | WEIGHT: 152 LBS | HEART RATE: 120 BPM

## 2021-04-28 DIAGNOSIS — S93.324D DISLOCATION OF TARSOMETATARSAL JOINT OF RIGHT FOOT, SUBSEQUENT ENCOUNTER: Primary | ICD-10-CM

## 2021-04-28 PROCEDURE — 99024 POSTOP FOLLOW-UP VISIT: CPT | Performed by: PODIATRIST

## 2021-04-28 PROCEDURE — 29405 APPL SHORT LEG CAST: CPT | Performed by: PODIATRIST

## 2021-04-28 RX ORDER — HYDROCODONE BITARTRATE AND ACETAMINOPHEN 10; 325 MG/1; MG/1
1 TABLET ORAL EVERY 8 HOURS PRN
Qty: 42 TABLET | Refills: 0 | Status: SHIPPED | OUTPATIENT
Start: 2021-04-28 | End: 2021-09-20

## 2021-04-28 NOTE — PROGRESS NOTES
Andres Wallace  1985  35 y.o. male     04/28/2021     Chief Complaint   Patient presents with   • Right Foot - post op recheck, Pain       History of Present Illness    Andres Wallace is a 35 y.o.male who presents to clinic for his second postoperative visit.  His splint is clean, dry and intact.    History reviewed. No pertinent past medical history.      Past Surgical History:   Procedure Laterality Date   • ORIF WRIST FRACTURE Left    • OTHER SURGICAL HISTORY      gastric surgery secondary to perforated ulcer         Family History   Problem Relation Age of Onset   • Diabetes Mother    • Cancer Maternal Grandfather    • Cancer Paternal Grandfather    • Diabetes Paternal Grandfather        No Known Allergies    Social History     Socioeconomic History   • Marital status:      Spouse name: Not on file   • Number of children: Not on file   • Years of education: Not on file   • Highest education level: Not on file   Tobacco Use   • Smoking status: Current Every Day Smoker     Packs/day: 1.00     Years: 19.00     Pack years: 19.00   • Smokeless tobacco: Current User     Types: Snuff   Vaping Use   • Vaping Use: Never used   Substance and Sexual Activity   • Alcohol use: Yes     Comment: socially   • Drug use: Not Currently   • Sexual activity: Defer         Current Outpatient Medications   Medication Sig Dispense Refill   • oxyCODONE-acetaminophen (Percocet) 7.5-325 MG per tablet Take 1 tablet by mouth Every 6 (Six) Hours As Needed for Moderate Pain . 28 tablet 0   • HYDROcodone-acetaminophen (Norco)  MG per tablet Take 1 tablet by mouth Every 8 (Eight) Hours As Needed for Moderate Pain . 42 tablet 0     No current facility-administered medications for this visit.       Review of Systems   Constitutional: Negative.    HENT: Negative.    Eyes: Negative.    Respiratory: Negative.    Cardiovascular: Negative.    Gastrointestinal: Negative.    Endocrine: Negative.    Genitourinary: Negative.    "  Musculoskeletal:        Right foot pain     Psychiatric/Behavioral: Negative.          OBJECTIVE    Pulse 120   Ht 177.8 cm (70\")   Wt 68.9 kg (152 lb)   SpO2 99%   BMI 21.81 kg/m²     Physical Exam  Vitals reviewed.   Constitutional:       General: He is not in acute distress.     Appearance: He is well-developed.   HENT:      Head: Normocephalic and atraumatic.      Nose: Nose normal.   Eyes:      Conjunctiva/sclera: Conjunctivae normal.      Pupils: Pupils are equal, round, and reactive to light.   Pulmonary:      Effort: Pulmonary effort is normal. No respiratory distress.      Breath sounds: No wheezing.   Musculoskeletal:         General: Swelling present.   Skin:     General: Skin is warm.      Capillary Refill: Capillary refill takes less than 2 seconds.      Findings: Bruising present.   Neurological:      Mental Status: He is alert and oriented to person, place, and time.   Psychiatric:         Behavior: Behavior normal.         Thought Content: Thought content normal.          Right Lower Extremity Exam: Incision sites well approximated.  No signs of dehiscence.  Improving edema right foot.  CFT immediate to distal digits.  Sensation intact to light touch.      Foot/Ankle Exam        Procedures    ASSESSMENT AND PLAN    Diagnoses and all orders for this visit:    1. Dislocation of tarsometatarsal joint of right foot, subsequent encounter (Primary)  -     HYDROcodone-acetaminophen (Norco)  MG per tablet; Take 1 tablet by mouth Every 8 (Eight) Hours As Needed for Moderate Pain .  Dispense: 42 tablet; Refill: 0          -Patient doing well postoperatively.  Sutures removed.  Applied short leg fiberglass cast.  Nonweightbearing right lower extremity.  Refilled pain medicine.  Recheck 2 weeks, repeat radiographs          This document has been electronically signed by Golden Maki DPM on April 28, 2021 14:50 CDT     4/28/2021  14:50 CDT    "

## 2021-05-12 ENCOUNTER — OFFICE VISIT (OUTPATIENT)
Dept: PODIATRY | Facility: CLINIC | Age: 36
End: 2021-05-12

## 2021-05-12 VITALS
SYSTOLIC BLOOD PRESSURE: 145 MMHG | DIASTOLIC BLOOD PRESSURE: 84 MMHG | HEIGHT: 70 IN | HEART RATE: 107 BPM | BODY MASS INDEX: 21.76 KG/M2 | WEIGHT: 152 LBS | OXYGEN SATURATION: 97 %

## 2021-05-12 DIAGNOSIS — S93.324D DISLOCATION OF TARSOMETATARSAL JOINT OF RIGHT FOOT, SUBSEQUENT ENCOUNTER: Primary | ICD-10-CM

## 2021-05-12 PROCEDURE — 99024 POSTOP FOLLOW-UP VISIT: CPT | Performed by: PODIATRIST

## 2021-05-12 RX ORDER — HYDROCODONE BITARTRATE AND ACETAMINOPHEN 7.5; 325 MG/1; MG/1
1 TABLET ORAL EVERY 12 HOURS PRN
Qty: 28 TABLET | Refills: 0 | Status: SHIPPED | OUTPATIENT
Start: 2021-05-12 | End: 2021-09-20

## 2021-05-12 NOTE — PROGRESS NOTES
Andres Wallace  1985  35 y.o. male     05/12/2021     Chief Complaint   Patient presents with   • Right Foot - post op recheck       History of Present Illness    Anrdes Wallace is a 35 y.o.male who presents to clinic for his third postoperative visit.  His cast is clean, dry and intact.    History reviewed. No pertinent past medical history.      Past Surgical History:   Procedure Laterality Date   • ORIF WRIST FRACTURE Left    • OTHER SURGICAL HISTORY      gastric surgery secondary to perforated ulcer         Family History   Problem Relation Age of Onset   • Diabetes Mother    • Cancer Maternal Grandfather    • Cancer Paternal Grandfather    • Diabetes Paternal Grandfather        No Known Allergies    Social History     Socioeconomic History   • Marital status:      Spouse name: Not on file   • Number of children: Not on file   • Years of education: Not on file   • Highest education level: Not on file   Tobacco Use   • Smoking status: Current Every Day Smoker     Packs/day: 1.00     Years: 19.00     Pack years: 19.00   • Smokeless tobacco: Current User     Types: Snuff   Vaping Use   • Vaping Use: Never used   Substance and Sexual Activity   • Alcohol use: Yes     Comment: socially   • Drug use: Not Currently   • Sexual activity: Defer         Current Outpatient Medications   Medication Sig Dispense Refill   • HYDROcodone-acetaminophen (Norco)  MG per tablet Take 1 tablet by mouth Every 8 (Eight) Hours As Needed for Moderate Pain . 42 tablet 0   • oxyCODONE-acetaminophen (Percocet) 7.5-325 MG per tablet Take 1 tablet by mouth Every 6 (Six) Hours As Needed for Moderate Pain . 28 tablet 0     No current facility-administered medications for this visit.       Review of Systems   Constitutional: Negative.    HENT: Negative.    Eyes: Negative.    Respiratory: Negative.    Cardiovascular: Negative.    Gastrointestinal: Negative.    Endocrine: Negative.    Genitourinary: Negative.     "  Musculoskeletal:        Right foot pain     Psychiatric/Behavioral: Negative.          OBJECTIVE    /84   Pulse 107   Ht 177.8 cm (70\")   Wt 68.9 kg (152 lb)   SpO2 97%   BMI 21.81 kg/m²     Physical Exam  Vitals reviewed.   Constitutional:       General: He is not in acute distress.     Appearance: He is well-developed.   HENT:      Head: Normocephalic and atraumatic.      Nose: Nose normal.   Eyes:      Conjunctiva/sclera: Conjunctivae normal.      Pupils: Pupils are equal, round, and reactive to light.   Pulmonary:      Effort: Pulmonary effort is normal. No respiratory distress.      Breath sounds: No wheezing.   Musculoskeletal:         General: Swelling present.   Skin:     General: Skin is warm.      Capillary Refill: Capillary refill takes less than 2 seconds.      Findings: Bruising present.   Neurological:      Mental Status: He is alert and oriented to person, place, and time.   Psychiatric:         Behavior: Behavior normal.         Thought Content: Thought content normal.          Right Lower Extremity Exam: Incision sites healed.   Improving edema right foot.  CFT immediate to distal digits.  Sensation intact to light touch.      Foot/Ankle Exam        Procedures    ASSESSMENT AND PLAN    Diagnoses and all orders for this visit:    1. Dislocation of tarsometatarsal joint of right foot, subsequent encounter (Primary)  -     XR Foot 3+ View Right          -Patient doing well postoperatively.  Radiographs taken and reviewed.  Reapplied short leg fiberglass cast.  Nonweightbearing right lower extremity.  Recheck 2 weeks.          This document has been electronically signed by Golden Maki DPM on May 12, 2021 09:27 CDT     5/12/2021  09:27 CDT    "

## 2021-05-26 ENCOUNTER — OFFICE VISIT (OUTPATIENT)
Dept: PODIATRY | Facility: CLINIC | Age: 36
End: 2021-05-26

## 2021-05-26 VITALS
BODY MASS INDEX: 21.76 KG/M2 | SYSTOLIC BLOOD PRESSURE: 128 MMHG | DIASTOLIC BLOOD PRESSURE: 74 MMHG | HEIGHT: 70 IN | HEART RATE: 91 BPM | OXYGEN SATURATION: 99 % | WEIGHT: 152 LBS

## 2021-05-26 DIAGNOSIS — S93.324D DISLOCATION OF TARSOMETATARSAL JOINT OF RIGHT FOOT, SUBSEQUENT ENCOUNTER: Primary | ICD-10-CM

## 2021-05-26 PROCEDURE — 99024 POSTOP FOLLOW-UP VISIT: CPT | Performed by: PODIATRIST

## 2021-05-26 NOTE — PROGRESS NOTES
Andres Wallace  1985  35 y.o. male     05/26/2021     Chief Complaint   Patient presents with   • Right Foot - Follow-up, Post-op       History of Present Illness    Andres Wallace is a 35 y.o.male who presents to clinic for his fourth postoperative visit.  His cast is clean, dry and intact.    History reviewed. No pertinent past medical history.      Past Surgical History:   Procedure Laterality Date   • ORIF FOOT FRACTURE Right 4/15/2021    Procedure: OPEN REDUCTION AND INTERNAL FIXATION OF RIGHT LISFRANC FRACTURE DISLOCATION;  Surgeon: Golden Maki DPM;  Location: Health system;  Service: Podiatry;  Laterality: Right;   • ORIF WRIST FRACTURE Left    • OTHER SURGICAL HISTORY      gastric surgery secondary to perforated ulcer         Family History   Problem Relation Age of Onset   • Diabetes Mother    • Cancer Maternal Grandfather    • Cancer Paternal Grandfather    • Diabetes Paternal Grandfather        No Known Allergies    Social History     Socioeconomic History   • Marital status:      Spouse name: Not on file   • Number of children: Not on file   • Years of education: Not on file   • Highest education level: Not on file   Tobacco Use   • Smoking status: Current Every Day Smoker     Packs/day: 1.00     Years: 19.00     Pack years: 19.00   • Smokeless tobacco: Current User     Types: Snuff   Vaping Use   • Vaping Use: Never used   Substance and Sexual Activity   • Alcohol use: Yes     Comment: socially   • Drug use: Not Currently   • Sexual activity: Defer         Current Outpatient Medications   Medication Sig Dispense Refill   • HYDROcodone-acetaminophen (Norco) 7.5-325 MG per tablet Take 1 tablet by mouth Every 12 (Twelve) Hours As Needed for Moderate Pain . 28 tablet 0   • HYDROcodone-acetaminophen (Norco)  MG per tablet Take 1 tablet by mouth Every 8 (Eight) Hours As Needed for Moderate Pain . 42 tablet 0   • oxyCODONE-acetaminophen (Percocet) 7.5-325 MG per tablet Take 1  "tablet by mouth Every 6 (Six) Hours As Needed for Moderate Pain . 28 tablet 0     No current facility-administered medications for this visit.       Review of Systems   Constitutional: Negative.    HENT: Negative.    Eyes: Negative.    Respiratory: Negative.    Cardiovascular: Negative.    Gastrointestinal: Negative.    Endocrine: Negative.    Genitourinary: Negative.    Musculoskeletal:        Right foot pain     Psychiatric/Behavioral: Negative.          OBJECTIVE    /74   Pulse 91   Ht 177.8 cm (70\")   Wt 68.9 kg (152 lb)   SpO2 99%   BMI 21.81 kg/m²     Physical Exam  Vitals reviewed.   Constitutional:       General: He is not in acute distress.     Appearance: He is well-developed.   HENT:      Head: Normocephalic and atraumatic.      Nose: Nose normal.   Eyes:      Conjunctiva/sclera: Conjunctivae normal.      Pupils: Pupils are equal, round, and reactive to light.   Pulmonary:      Effort: Pulmonary effort is normal. No respiratory distress.      Breath sounds: No wheezing.   Musculoskeletal:         General: Swelling present.   Skin:     General: Skin is warm.      Capillary Refill: Capillary refill takes less than 2 seconds.      Findings: Bruising present.   Neurological:      Mental Status: He is alert and oriented to person, place, and time.   Psychiatric:         Behavior: Behavior normal.         Thought Content: Thought content normal.          Right Lower Extremity Exam: Incision sites healed.   Improving edema right foot.  CFT immediate to distal digits.  Sensation intact to light touch.      Foot/Ankle Exam        Procedures    ASSESSMENT AND PLAN    Diagnoses and all orders for this visit:    1. Dislocation of tarsometatarsal joint of right foot, subsequent encounter (Primary)  -     Ambulatory Referral to Physical Therapy Evaluate and treat, POST OP; (ice); Strengthening, ROM; Right; Full weight bearing (in boot)          -Patient doing well postoperatively.  Dispensed a cam boot.  " Start weightbearing in cam boot.  Referral to physical therapy.  Recheck 2 weeks          This document has been electronically signed by Golden Maki DPM on May 28, 2021 10:34 CDT     5/28/2021  10:34 CDT

## 2021-05-27 ENCOUNTER — HOSPITAL ENCOUNTER (OUTPATIENT)
Dept: PHYSICAL THERAPY | Facility: HOSPITAL | Age: 36
Setting detail: THERAPIES SERIES
Discharge: HOME | End: 2021-05-27

## 2021-05-27 DIAGNOSIS — S93.324D DISLOCATION OF TARSOMETATARSAL JOINT OF RIGHT FOOT, SUBSEQUENT ENCOUNTER: Primary | ICD-10-CM

## 2021-05-27 PROCEDURE — 97110 THERAPEUTIC EXERCISES: CPT | Performed by: PHYSICAL THERAPIST

## 2021-05-27 PROCEDURE — 97161 PT EVAL LOW COMPLEX 20 MIN: CPT | Performed by: PHYSICAL THERAPIST

## 2021-05-27 PROCEDURE — G0283 ELEC STIM OTHER THAN WOUND: HCPCS | Performed by: PHYSICAL THERAPIST

## 2021-05-27 NOTE — THERAPY EVALUATION
Outpatient Physical Therapy Ortho Initial Evaluation  [facility]     Patient Name: Andres Wallace  : 1985  MRN: [id number]  Today's Date: 2021      Visit Date: 2021  Visit   Return to MD: NORMA  Re-cert date: 21  Patient Active Problem List   Diagnosis   • Dislocation of tarsometatarsal joint of right foot        History reviewed. No pertinent past medical history.     Past Surgical History:   Procedure Laterality Date   • ORIF FOOT FRACTURE Right 4/15/2021    Procedure: OPEN REDUCTION AND INTERNAL FIXATION OF RIGHT LISFRANC FRACTURE DISLOCATION;  Surgeon: Golden Maki DPM;  Location: [facility];  Service: Podiatry;  Laterality: Right;   • ORIF WRIST FRACTURE Left    • OTHER SURGICAL HISTORY      gastric surgery secondary to perforated ulcer       Visit Dx:     ICD-10-CM ICD-9-CM   1. Dislocation of tarsometatarsal joint of right foot, subsequent encounter  S93.324D V58.89     Medications (Admitted on 2021)     HYDROcodone-acetaminophen (Norco)  MG per tablet  HYDROcodone-acetaminophen (Norco) 7.5-325 MG per tablet  oxyCODONE-acetaminophen (Percocet) 7.5-325 MG per tablet    Allergies: NKA    PT Ortho     Row Name 21 0800       Subjective Comments    Subjective Comments  36 yo male with work related injury on 3/29/21 when a 600 lb item fell on his R foot, dislocating and fracturing his R tarsometatarsal joint. S/p R foot ORIF on 4/15/2021 with a plate and 8 screws. FWB wearing a boot at the PT evaluatiion.   -BS       Precautions and Contraindications    Precautions/Limitations  no known precautions/limitations  -BS       Subjective Pain    Able to rate subjective pain?  yes  -BS    Pre-Treatment Pain Level  5  -BS    Post-Treatment Pain Level  5  -BS    Subjective Pain Comment  R lateral foot/ankle  -BS       Posture/Observations    Posture/Observations Comments  TTP along 2nd-3rd metatarsal region, min to mod edema with R foot; ambulating into the clinic  wearing a boot wih the R foot.  -BS       General ROM    GENERAL ROM COMMENTS  AROM: R ankle-DF -2° PF 30° inv 28° aly 10° L ankle-DF 6° PF 50° inv 40° aly 16°   -BS       MMT (Manual Muscle Testing)    General MMT Comments  MMT: R LE-hip flex 5/5 knee flex/ext 5/5 ankle inv 4/5 aly 3+/5 DF 4/5 PF 2+/5 L LE 5/5   -BS       Sensation    Light Touch  Partial deficits in the RLE paresthesia along dorsum of R foot and R post thigh   -BS      User Key  (r) = Recorded By, (t) = Taken By, (c) = Cosigned By    Initials Name Provider Type    Aaron Driscoll PT Physical Therapist                            PT OP Goals     Row Name 05/27/21 0800          PT Short Term Goals    STG Date to Achieve  06/10/21  -BS     STG 1  Patient independent with HEP for the R foot/ankle  -BS     STG 1 Progress  New  -BS     STG 2  Improve R ankle DF/PF to 2°/35°  -BS     STG 2 Progress  New  -BS     STG 3  Improve R ankle MMT to 5/5  -BS     STG 3 Progress  New  -BS     STG 4  Reduce R foot pain by 50% with standing and walking tasks  -BS     STG 4 Progress  New  -BS     STG 5  Improve R ankle inv/aly AROM to 35° and 13°  -BS     STG 5 Progress  New  -BS        Long Term Goals    LTG Date to Achieve  06/24/21  -BS     LTG 1  Improve R ankle DF/PF to 6°/40°  -BS     LTG 1 Progress  New  -BS     LTG 2  Reduce R foot pain by 75% with standing and walking tasks  -BS     LTG 2 Progress  New  -BS     LTG 3  Able to walk without walking boot with equal weight bearing (R=L) with non-antalgic gait  -BS     LTG 3 Progress  New  -BS        Time Calculation    PT Goal Re-Cert Due Date  06/17/21  -BS       User Key  (r) = Recorded By, (t) = Taken By, (c) = Cosigned By    Initials Name Provider Type    Aaron Driscoll PT Physical Therapist          PT Assessment/Plan     Row Name 05/27/21 0800          PT Assessment    Functional Limitations  Impaired gait;Performance in work activities;Performance in sport activities;Performance in leisure  activities;Limitation in home management  -BS     Impairments  Balance;Endurance;Edema;Gait;Impaired flexibility;Muscle strength;Pain;Range of motion;Sensation  -BS     Assessment Comments  Acute R foot/ankle pain s/p OPEN REDUCTION AND INTERNAL FIXATION OF RIGHT LISFRANC FRACTURE DISLOCATION on 4/15/21.  Presents limited by R foot/ankle pain, R ankle weakness and ROM restrictions, antalgic gait ambulating in walking boot. Would benefit from skilled PT to facilitate ambulation without compensations, improve R ankle MMT/ROM and facilitate return to work.   -BS     Please refer to paper survey for additional self-reported information  Yes  -BS     Rehab Potential  Good  -BS     Patient/caregiver participated in establishment of treatment plan and goals  Yes  -BS     Patient would benefit from skilled therapy intervention  Yes  -BS        PT Plan    PT Frequency  2x/week  -BS     Predicted Duration of Therapy Intervention (PT)  6-8 weeks then TBD  -BS     Planned CPT's?  PT EVAL LOW COMPLEXITY: 84013;PT RE-EVAL: 35650;PT THER PROC EA 15 MIN: 92298;PT THER ACT EA 15 MIN: 86189;PT MANUAL THERAPY EA 15 MIN: 04088;PT NEUROMUSC RE-EDUCATION EA 15 MIN: 32081;PT GAIT TRAINING EA 15 MIN: 43341;PT ELECTRICAL STIM UNATTEND: ;PT ULTRASOUND EA 15 MIN: 44222;PT HOT/COLD PACK WC NONMCARE: 56229;PT THER SUPP EA 15 MIN  -BS     Physical Therapy Interventions (Optional Details)  balance training;gait training;home exercise program;manual therapy techniques;modalities;neuromuscular re-education;patient/family education;ROM (Range of Motion);stair training;strengthening;stretching;transfer training  -BS     PT Plan Comments  Slowly advance with seated HR/TR, will email surgeon for when it is ok to FWB without the boot.   -BS       User Key  (r) = Recorded By, (t) = Taken By, (c) = Cosigned By    Initials Name Provider Type    BS Aaron Eller, PT Physical Therapist          Modalities     Row Name 05/27/21 0800             Ice     "Ice Applied  Yes 15 mins  -BS      Location  R foot  -BS      Ice S/P Rx  Yes  -BS         ELECTRICAL STIMULATION    Attended/Unattended  Unattended  -BS      Stimulation Type  IFC  -BS      Location/Electrode Placement/Other  R foot  -BS      PT E-Stim Unattended Minutes  15  -BS        User Key  (r) = Recorded By, (t) = Taken By, (c) = Cosigned By    Initials Name Provider Type    Aaron Driscoll PT Physical Therapist        OP Exercises     Row Name 05/27/21 0800             Precautions    Existing Precautions/Restrictions  other (see comments);weight bearing WBAT in boot, RLE  -BS         Subjective Comments    Subjective Comments  34 yo male with work related injury on 3/29/21 when a 600 lb item fell on his R foot, dislocating and fracturing his R tarsometatarsal joint. S/p R foot ORIF on 4/15/2021 with a plate and 8 screws. FWB wearing a boot at the PT evaluatiion.   -BS         Subjective Pain    Able to rate subjective pain?  yes  -BS      Pre-Treatment Pain Level  5  -BS      Post-Treatment Pain Level  5  -BS      Subjective Pain Comment  R lateral foot/ankle  -BS         Exercise 1    Exercise Name 1  long sitting gastroc S with towel  -BS      Sets 1  1  -BS      Reps 1  3  -BS      Time 1  30\" hold   -BS      Additional Comments  R only  -BS         Exercise 2    Exercise Name 2  4 way ankle w/ yellow TB  -BS      Sets 2  1  -BS      Reps 2  20 ea  -BS         Exercise 3    Exercise Name 3  ankle pumps (ankle DF/PF in supine AROM)  -BS      Sets 3  1  -BS      Reps 3  20 ea  -BS         Exercise 4    Exercise Name 4  ankle circles  -BS      Sets 4  1  -BS      Reps 4  20 ea  -BS      Additional Comments  cw/ccw  -BS         Exercise 5    Exercise Name 5  towel grabs into aly/inv with R foot  -BS      Time 5  4'  -BS         Exercise 6    Exercise Name 6  see modalities  -BS        User Key  (r) = Recorded By, (t) = Taken By, (c) = Cosigned By    Initials Name Provider Type    Aaron Driscoll PT " Physical Therapist                        Outcome Measure Options: Lower Extremity Functional Scale (LEFS)  Lower Extremity Functional Index  Any of your usual work, housework or school activities: Extreme difficulty or unable to perform activity  Your usual hobbies, recreational or sporting activities: Extreme difficulty or unable to perform activity  Getting into or out of the bath: Moderate difficulty  Walking between rooms: A little bit of difficulty  Putting on your shoes or socks: A little bit of difficulty  Squatting: A little bit of difficulty  Lifting an object, like a bag of groceries from the floor: Moderate difficulty  Performing light activities around your home: Moderate difficulty  Performing heavy activities around your home: Extreme difficulty or unable to perform activity  Getting into or out of a car: Moderate difficulty  Walking 2 blocks: Moderate difficulty  Walking a mile: Extreme difficulty or unable to perform activity  Going up or down 10 stairs (about 1 flight of stairs): Moderate difficulty  Standing for 1 hour: Quite a bit of difficulty  Sitting for 1 hour: No difficulty  Running on even ground: Extreme difficulty or unable to perform activity  Running on uneven ground: Extreme difficulty or unable to perform activity  Making sharp turns while running fast: Extreme difficulty or unable to perform activity  Hopping: Quite a bit of difficulty  Rolling over in bed: No difficulty  Total: 31      Time Calculation:     Start Time: 0804  Stop Time: 0858  Time Calculation (min): 54 min  PT Non-Billable Time (min): 15 min  Total Timed Code Minutes- PT: 39 minute(s)  Untimed Charges  PT E-Stim Unattended Minutes: 15  Total Minutes  Untimed Charges Total Minutes: 15   Total Minutes: 15     Therapy Charges for Today     Code Description Service Date Service Provider Modifiers Qty    88752052396 HC PT EVAL LOW COMPLEXITY 2 5/27/2021 Aaron Eller, PT GP 1    38802352495 HC PT THER PROC EA 15 MIN  5/27/2021 Aaron Eller, PT GP 1    79546232495 HC PT ELECTRICAL STIM UNATTENDED 5/27/2021 Aaron Eller, PT  1          PT G-Codes  Outcome Measure Options: Lower Extremity Functional Scale (LEFS)  Total: 31         Aaron Eller, PT  5/27/2021

## 2021-06-01 ENCOUNTER — HOSPITAL ENCOUNTER (OUTPATIENT)
Dept: PHYSICAL THERAPY | Facility: HOSPITAL | Age: 36
Setting detail: THERAPIES SERIES
Discharge: HOME | End: 2021-06-01

## 2021-06-01 DIAGNOSIS — S93.324D DISLOCATION OF TARSOMETATARSAL JOINT OF RIGHT FOOT, SUBSEQUENT ENCOUNTER: Primary | ICD-10-CM

## 2021-06-01 PROCEDURE — 97140 MANUAL THERAPY 1/> REGIONS: CPT | Performed by: PHYSICAL THERAPIST

## 2021-06-01 PROCEDURE — 97535 SELF CARE MNGMENT TRAINING: CPT | Performed by: PHYSICAL THERAPIST

## 2021-06-01 PROCEDURE — 97110 THERAPEUTIC EXERCISES: CPT | Performed by: PHYSICAL THERAPIST

## 2021-06-01 NOTE — THERAPY TREATMENT NOTE
Outpatient Physical Therapy Ortho Treatment Note  [facility]     Patient Name: Andres Wallace  : 1985  MRN: [id number]  Today's Date: 2021      Visit Date: 2021  Attendance:  2/2 (10 approved)  Subjective % Improvement: 0%  Recert Date: 21  MD appointment: TBD    Therapy Diagnosis: ORIF right ankle transmetatarsal right       Visit Dx:    ICD-10-CM ICD-9-CM   1. Dislocation of tarsometatarsal joint of right foot, subsequent encounter  S93.324D V58.89       Patient Active Problem List   Diagnosis   • Dislocation of tarsometatarsal joint of right foot        No past medical history on file.     Past Surgical History:   Procedure Laterality Date   • ORIF FOOT FRACTURE Right 4/15/2021    Procedure: OPEN REDUCTION AND INTERNAL FIXATION OF RIGHT LISFRANC FRACTURE DISLOCATION;  Surgeon: Golden Maki DPM;  Location: [facility];  Service: Podiatry;  Laterality: Right;   • ORIF WRIST FRACTURE Left    • OTHER SURGICAL HISTORY      gastric surgery secondary to perforated ulcer       PT Ortho     Row Name 21 0805       Precautions and Contraindications    Precautions  WBAT in boot   -BB       Subjective Pain    Able to rate subjective pain?  yes  -BB    Pre-Treatment Pain Level  3  -BB       Posture/Observations    Posture/Observations Comments  DF: 8 degrees, global edema of midfoot and toes with discoloration in gravity dependent position   -BB      User Key  (r) = Recorded By, (t) = Taken By, (c) = Cosigned By    Initials Name Provider Type    BB Ana Del Cid, PT DPT Physical Therapist                      PT Assessment/Plan     Row Name 21 0805          PT Assessment    Assessment Comments  Tolerated treatment well. Increased focus today on edema control and gentle ROM. No pain increase.   -BB     Rehab Potential  Good  -BB     Patient/caregiver participated in establishment of treatment plan and goals  Yes  -BB     Patient would benefit from skilled therapy intervention   Yes  -BB        PT Plan    PT Frequency  2x/week  -BB     Predicted Duration of Therapy Intervention (PT)  6-8 weeks then TBD  -BB     PT Plan Comments  monitor edema PRN, isometric and isotonic exercises per MD order  -BB       User Key  (r) = Recorded By, (t) = Taken By, (c) = Cosigned By    Initials Name Provider Type    Ana Ashby PT DPT Physical Therapist            OP Exercises     Row Name 06/01/21 0805             Subjective Comments    Subjective Comments  Reports walking some at home without the boot on carpet. Works on ROM daily.   -BB         Subjective Pain    Able to rate subjective pain?  yes  -BB      Pre-Treatment Pain Level  3  -BB      Post-Treatment Pain Level  3  -BB         Exercise 1    Exercise Name 1  Tubi  to assist swelling   -BB      Time 1  2'  -BB      Additional Comments  educated on wear during day   -BB         Exercise 2    Exercise Name 2  wobble board all planes   -BB      Reps 2  30 x each   -BB         Exercise 3    Exercise Name 3  toe ROM  -BB      Time 3  2' PROM   -BB         Exercise 4    Exercise Name 4  retro edema massage elevated   -BB      Time 4  12'  -BB         Exercise 5    Exercise Name 5  sitting TR/HR   -BB      Reps 5  20x each   -BB      Additional Comments  HEP  -BB        User Key  (r) = Recorded By, (t) = Taken By, (c) = Cosigned By    Initials Name Provider Type    Ana Ashby PT DPT Physical Therapist                       PT OP Goals     Row Name 06/01/21 0805          PT Short Term Goals    STG Date to Achieve  06/10/21  -BB     STG 1  Patient independent with HEP for the R foot/ankle  -BB     STG 1 Progress  Not Met  -BB     STG 2  Improve R ankle DF/PF to 2°/35°  -BB     STG 2 Progress  Not Met  -BB     STG 3  Improve R ankle MMT to 5/5  -BB     STG 3 Progress  Not Met  -BB     STG 4  Reduce R foot pain by 50% with standing and walking tasks  -BB     STG 4 Progress  Not Met  -BB     STG 5  Improve R ankle inv/aly AROM to 35° and 13°   -BB     STG 5 Progress  Not Met  -BB        Long Term Goals    LTG Date to Achieve  06/24/21  -BB     LTG 1  Improve R ankle DF/PF to 6°/40°  -BB     LTG 1 Progress  Not Met  -BB     LTG 2  Reduce R foot pain by 75% with standing and walking tasks  -BB     LTG 2 Progress  Not Met  -BB     LTG 3  Able to walk without walking boot with equal weight bearing (R=L) with non-antalgic gait  -BB     LTG 3 Progress  Not Met  -BB        Time Calculation    PT Goal Re-Cert Due Date  06/17/21  -BB       User Key  (r) = Recorded By, (t) = Taken By, (c) = Cosigned By    Initials Name Provider Type    Ana Ashby PT DPT Physical Therapist          Therapy Education  Education Details: ecouraged to remain WBAT in boot at MD instructed. sitting TR/HR   Given: HEP  Program: New  How Provided: Verbal  Provided to: Patient  Level of Understanding: Verbalized              Time Calculation:   Start Time: 0805  Stop Time: 0846  Time Calculation (min): 41 min  Therapy Charges for Today     Code Description Service Date Service Provider Modifiers Qty    08058704137 HC PT THER PROC EA 15 MIN 6/1/2021 Ana Del Cid PT DPT GP 1    10738159627 HC PT MANUAL THERAPY EA 15 MIN 6/1/2021 Ana Del Cid PT DPT GP 1    73700194147 HC PT SELF CARE/MGMT/TRAIN EA 15 MIN 6/1/2021 Ana Del Cid PT DPT GP 1                    Ana Del Cid PT DPT  6/1/2021

## 2021-06-03 ENCOUNTER — HOSPITAL ENCOUNTER (OUTPATIENT)
Dept: PHYSICAL THERAPY | Facility: HOSPITAL | Age: 36
Setting detail: THERAPIES SERIES
Discharge: HOME | End: 2021-06-03

## 2021-06-03 DIAGNOSIS — S93.324D DISLOCATION OF TARSOMETATARSAL JOINT OF RIGHT FOOT, SUBSEQUENT ENCOUNTER: Primary | ICD-10-CM

## 2021-06-03 PROCEDURE — G0283 ELEC STIM OTHER THAN WOUND: HCPCS

## 2021-06-03 PROCEDURE — 97110 THERAPEUTIC EXERCISES: CPT

## 2021-06-03 NOTE — THERAPY TREATMENT NOTE
Outpatient Physical Therapy Ortho Treatment Note  [facility]     Patient Name: Andres Wallace  : 1985  MRN: [id number]  Today's Date: 6/3/2021      Visit Date: 2021     Subjective improvement 0  Visits 3/3  Visits approved 10  RTND   Recert Date 2021    S/P R ORIF R transmetatarsal on 4-      Visit Dx:    ICD-10-CM ICD-9-CM   1. Dislocation of tarsometatarsal joint of right foot, subsequent encounter  S93.324D V58.89       Patient Active Problem List   Diagnosis   • Dislocation of tarsometatarsal joint of right foot        No past medical history on file.     Past Surgical History:   Procedure Laterality Date   • ORIF FOOT FRACTURE Right 4/15/2021    Procedure: OPEN REDUCTION AND INTERNAL FIXATION OF RIGHT LISFRANC FRACTURE DISLOCATION;  Surgeon: Golden Maki DPM;  Location: [facility];  Service: Podiatry;  Laterality: Right;   • ORIF WRIST FRACTURE Left    • OTHER SURGICAL HISTORY      gastric surgery secondary to perforated ulcer       PT Ortho     Row Name 21 0800       Subjective Comments    Subjective Comments  patient states that he stopped using the crutches last week.  He really isnt having any problems.  He does report that something stops his foot from moving back.  -CP       Precautions and Contraindications    Precautions  WBAT in boot  -CP       Subjective Pain    Able to rate subjective pain?  yes  -CP    Pre-Treatment Pain Level  1  -CP       Posture/Observations    Posture/Observations Comments  amb in boot  -CP      User Key  (r) = Recorded By, (t) = Taken By, (c) = Cosigned By    Initials Name Provider Type    Charlotte Juan PTA Physical Therapy Assistant                      PT Assessment/Plan     Row Name 21 0911          PT Assessment    Assessment Comments  Very good tolerance to ther ex this date.  He does have limited great toe P/AROM  -CP        PT Plan    PT Frequency  2x/week  -CP     Predicted Duration of Therapy  "Intervention (PT)  6-8 weeks  -CP     PT Plan Comments  Cont with POC.  Pro II if patient brings tennis shoes  -CP       User Key  (r) = Recorded By, (t) = Taken By, (c) = Cosigned By    Initials Name Provider Type    Charlotte Juan PTA Physical Therapy Assistant          Modalities     Row Name 06/03/21 0800             Ice    Ice Applied  Yes  -CP      Location  R foot/ankle  -CP      PT Ice Rx Minutes  15  -CP      Ice S/P Rx  Yes  -CP         ELECTRICAL STIMULATION    Attended/Unattended  Unattended  -CP      Stimulation Type  IFC  -CP      Location/Electrode Placement/Other  R foot/ankle  -CP      PT E-Stim Unattended Minutes  15  -CP        User Key  (r) = Recorded By, (t) = Taken By, (c) = Cosigned By    Initials Name Provider Type    Charlotte Juan, NAYE Physical Therapy Assistant        OP Exercises     Row Name 06/03/21 0914 06/03/21 0800          Subjective Comments    Subjective Comments  --  patient states that he stopped using the crutches last week.  He really isnt having any problems.  He does report that something stops his foot from moving back.  -CP        Subjective Pain    Able to rate subjective pain?  --  yes  -CP     Pre-Treatment Pain Level  --  1  -CP     Post-Treatment Pain Level  --  -- numb  -CP        Total Minutes    60166 - PT Therapeutic Exercise Minutes  45  -CP  --        Exercise 1    Exercise Name 1  --  seated heelslides for DF stretch  -CP     Cueing 1  --  Verbal;Demo  -CP     Reps 1  --  20  -CP     Time 1  --  3\" holds  -CP        Exercise 2    Exercise Name 2  --  seated IN/EV  -CP     Cueing 2  --  Verbal;Demo  -CP     Reps 2  --  20  -CP        Exercise 3    Exercise Name 3  --  seated CR/TR  -CP     Cueing 3  --  Verbal;Demo  -CP     Reps 3  --  20  -CP        Exercise 4    Exercise Name 4  --  toe yoga  -CP     Cueing 4  --  Verbal;Demo  -CP     Reps 4  --  10  -CP        Exercise 5    Exercise Name 5  --  LAQ  -CP     Cueing 5  --  Verbal;Demo  -CP     Sets " "5  --  2  -CP     Reps 5  --  10  -CP     Time 5  --  5\" holds  -CP        Exercise 6    Exercise Name 6  --  seated rocker board DF/PF  -CP     Cueing 6  --  Verbal;Demo  -CP     Sets 6  --  2  -CP     Reps 6  --  10  -CP        Exercise 7    Exercise Name 7  --  wobble board  -CP     Cueing 7  --  Verbal;Tactile  -CP     Reps 7  --  20 CW/CCW  -CP        Exercise 8    Exercise Name 8  --  long sit DF stretch with strap  -CP     Cueing 8  --  Verbal;Tactile  -CP     Sets 8  --  3  -CP     Time 8  --  30  -CP        Exercise 9    Exercise Name 9  --  long sit soleous stretch with stretch  -CP     Cueing 9  --  Verbal;Demo  -CP     Sets 9  --  3  -CP     Time 9  --  30  -CP        Exercise 10    Exercise Name 10  --  see manual  -CP       User Key  (r) = Recorded By, (t) = Taken By, (c) = Cosigned By    Initials Name Provider Type    CP Charlotte Bianchi, NAYE Physical Therapy Assistant                      Manual Rx (last 36 hours)      Manual Treatments     Row Name 06/03/21 0700             Manual Rx 1    Manual Rx 1 Location  right ankle/foot  -CP      Manual Rx 1 Type  scar massage  -CP        User Key  (r) = Recorded By, (t) = Taken By, (c) = Cosigned By    Initials Name Provider Type    CP Charlotte Bianchi, NAYE Physical Therapy Assistant          PT OP Goals     Row Name 06/03/21 0800          PT Short Term Goals    STG Date to Achieve  06/10/21  -CP     STG 1  Patient independent with HEP for the R foot/ankle  -CP     STG 1 Progress  Not Met  -CP     STG 2  Improve R ankle DF/PF to 2°/35°  -CP     STG 2 Progress  Not Met  -CP     STG 3  Improve R ankle MMT to 5/5  -CP     STG 3 Progress  Not Met  -CP     STG 4  Reduce R foot pain by 50% with standing and walking tasks  -CP     STG 4 Progress  Not Met  -CP     STG 5  Improve R ankle inv/aly AROM to 35° and 13°  -CP     STG 5 Progress  Not Met  -CP        Long Term Goals    LTG Date to Achieve  06/24/21  -CP     LTG 1  Improve R ankle DF/PF to 6°/40°  -CP     " LTG 1 Progress  Not Met  -CP     LTG 2  Reduce R foot pain by 75% with standing and walking tasks  -CP     LTG 2 Progress  Not Met  -CP     LTG 3  Able to walk without walking boot with equal weight bearing (R=L) with non-antalgic gait  -CP     LTG 3 Progress  Not Met  -CP        Time Calculation    PT Goal Re-Cert Due Date  06/17/21  -CP       User Key  (r) = Recorded By, (t) = Taken By, (c) = Cosigned By    Initials Name Provider Type    CP Charlotte Bianchi PTA Physical Therapy Assistant          Therapy Education  Education Details: seated CR/TR/. LAQ, seated heelslides for DF stretch, seated IN/EV, toe yoga, long sit DF stretch, Long sit soleous stretch  Given: HEP  Program: New  How Provided: Verbal, Demonstration, Written  Provided to: Patient  Level of Understanding: Teach back education performed, Verbalized, Demonstrated              Time Calculation:   Start Time: 0800  Stop Time: 0905  Time Calculation (min): 65 min  Total Timed Code Minutes- PT: 45 minute(s)  Timed Charges  17179 - PT Therapeutic Exercise Minutes: 45  Untimed Charges  PT E-Stim Unattended Minutes: 15  PT Ice Rx Minutes: 15  Total Minutes  Timed Charges Total Minutes: 45  Untimed Charges Total Minutes: 15   Total Minutes: 60  Therapy Charges for Today     Code Description Service Date Service Provider Modifiers Qty    85824432040 HC PT ELECTRICAL STIM UNATTENDED 6/3/2021 Charlotte Bianchi PTA  1    97844538963 HC PT THER PROC EA 15 MIN 6/3/2021 Charlotte Bianchi PTA GP 3                    Charlotte Bianchi PTA  6/3/2021

## 2021-06-08 ENCOUNTER — HOSPITAL ENCOUNTER (OUTPATIENT)
Dept: PHYSICAL THERAPY | Facility: HOSPITAL | Age: 36
Setting detail: THERAPIES SERIES
Discharge: HOME | End: 2021-06-08

## 2021-06-08 DIAGNOSIS — S93.324D DISLOCATION OF TARSOMETATARSAL JOINT OF RIGHT FOOT, SUBSEQUENT ENCOUNTER: Primary | ICD-10-CM

## 2021-06-08 PROCEDURE — G0283 ELEC STIM OTHER THAN WOUND: HCPCS | Performed by: PHYSICAL THERAPIST

## 2021-06-08 PROCEDURE — 97110 THERAPEUTIC EXERCISES: CPT | Performed by: PHYSICAL THERAPIST

## 2021-06-08 NOTE — THERAPY TREATMENT NOTE
Outpatient Physical Therapy Ortho Treatment Note  [facility]     Patient Name: Andres Wallace  : 1985  MRN: [id number]  Today's Date: 2021      Visit Date: 2021  Subjective improvement 0  Visits 4/4  Visits approved 10  RTND   Recert Date 2021     S/P R ORIF R transmetatarsal on 4-  Visit Dx:    ICD-10-CM ICD-9-CM   1. Dislocation of tarsometatarsal joint of right foot, subsequent encounter  S93.324D V58.89       Patient Active Problem List   Diagnosis   • Dislocation of tarsometatarsal joint of right foot        No past medical history on file.     Past Surgical History:   Procedure Laterality Date   • ORIF FOOT FRACTURE Right 4/15/2021    Procedure: OPEN REDUCTION AND INTERNAL FIXATION OF RIGHT LISFRANC FRACTURE DISLOCATION;  Surgeon: Golden Maki DPM;  Location: [facility];  Service: Podiatry;  Laterality: Right;   • ORIF WRIST FRACTURE Left    • OTHER SURGICAL HISTORY      gastric surgery secondary to perforated ulcer       PT Ortho     Row Name 21 0800       Precautions and Contraindications    Precautions  WBAT in boot  -BS       Subjective Pain    Able to rate subjective pain?  yes  -BS    Pre-Treatment Pain Level  1  -BS    Post-Treatment Pain Level  0  -BS       Posture/Observations    Posture/Observations Comments  amb in boot  -BS      User Key  (r) = Recorded By, (t) = Taken By, (c) = Cosigned By    Initials Name Provider Type    Aaron Driscoll, PT Physical Therapist                      PT Assessment/Plan     Row Name 21 0800          PT Assessment    Assessment Comments  Good tolerance to ice/IFC post tx. Limited by mild to mod edema with dorsum of R foot. Moderately antalgic gait pattern ambulating very short distance (15-20') without walking boot.  -BS        PT Plan    PT Frequency  2x/week  -BS     Predicted Duration of Therapy Intervention (PT)  6-8 weeks  -BS     PT Plan Comments  advance to green TB with 4 way ankle. Pt to see  "podiatrist tomorrow, await orders for progression and wt bearing with the R LE.   -BS       User Key  (r) = Recorded By, (t) = Taken By, (c) = Cosigned By    Initials Name Provider Type    Aaron Driscoll, PT Physical Therapist          Modalities     Row Name 06/08/21 0800             Ice    Ice Applied  Yes 15'  -BS      Location  R foot/ankle  -BS      Ice S/P Rx  Yes  -BS         ELECTRICAL STIMULATION    Attended/Unattended  Unattended  -BS      Stimulation Type  IFC  -BS      Location/Electrode Placement/Other  R foot/ankle  -BS      PT E-Stim Unattended Minutes  15  -BS        User Key  (r) = Recorded By, (t) = Taken By, (c) = Cosigned By    Initials Name Provider Type    Aaron Driscoll, PT Physical Therapist        OP Exercises     Row Name 06/08/21 0800             Subjective Comments    Subjective Comments  Patient reports did weedeating last night wearing his boot out in the yard. No additional soreness or swelling this morning.   -BS         Subjective Pain    Able to rate subjective pain?  yes  -BS      Pre-Treatment Pain Level  1  -BS      Post-Treatment Pain Level  0  -BS         Exercise 1    Exercise Name 1  seated heelslides for DF stretch  -BS      Reps 1  20  -BS      Time 1  3\" holds  -BS         Exercise 2    Exercise Name 2  Pro II, level 1  -BS      Reps 2  10'  -BS         Exercise 3    Exercise Name 3  seated CR/TR  -BS      Reps 3  20  -BS         Exercise 4    Exercise Name 4  long sitting gastroc S w/ strap  -BS      Sets 4  1  -BS      Reps 4  3  -BS      Time 4  30\" hold  -BS         Exercise 5    Exercise Name 5  long sitting soleus S w/ strap  -BS      Sets 5  1  -BS      Reps 5  3  -BS      Time 5  30\" hold  -BS         Exercise 6    Exercise Name 6  seated rocker board DF/PF  -BS      Sets 6  3  -BS      Reps 6  10  -BS         Exercise 7    Exercise Name 7  wobble board  -BS      Reps 7  20 CW/CCW  -BS         Exercise 8    Exercise Name 8  see modalities  -BS        User Key "  (r) = Recorded By, (t) = Taken By, (c) = Cosigned By    Initials Name Provider Type    BS Aaron Eller, PT Physical Therapist                       PT OP Goals     Row Name 06/08/21 0800          PT Short Term Goals    STG Date to Achieve  06/10/21  -BS     STG 1  Patient independent with HEP for the R foot/ankle  -BS     STG 1 Progress  Not Met  -BS     STG 2  Improve R ankle DF/PF to 2°/35°  -BS     STG 2 Progress  Not Met  -BS     STG 3  Improve R ankle MMT to 5/5  -BS     STG 3 Progress  Not Met  -BS     STG 4  Reduce R foot pain by 50% with standing and walking tasks  -BS     STG 4 Progress  Not Met  -BS     STG 5  Improve R ankle inv/aly AROM to 35° and 13°  -BS     STG 5 Progress  Not Met  -BS        Long Term Goals    LTG Date to Achieve  06/24/21  -BS     LTG 1  Improve R ankle DF/PF to 6°/40°  -BS     LTG 1 Progress  Not Met  -BS     LTG 2  Reduce R foot pain by 75% with standing and walking tasks  -BS     LTG 2 Progress  Not Met  -BS     LTG 3  Able to walk without walking boot with equal weight bearing (R=L) with non-antalgic gait  -BS     LTG 3 Progress  Not Met  -BS        Time Calculation    PT Goal Re-Cert Due Date  06/17/21  -BS       User Key  (r) = Recorded By, (t) = Taken By, (c) = Cosigned By    Initials Name Provider Type    BS Aaron Eller, PT Physical Therapist                         Time Calculation:   Start Time: 0805  Stop Time: 0913  Time Calculation (min): 68 min  PT Non-Billable Time (min): 15 min  Total Timed Code Minutes- PT: 53 minute(s)  Untimed Charges  PT E-Stim Unattended Minutes: 15  Total Minutes  Untimed Charges Total Minutes: 15   Total Minutes: 15  Therapy Charges for Today     Code Description Service Date Service Provider Modifiers Qty    55708333464 HC PT THER PROC EA 15 MIN 6/8/2021 Aaron Eller, PT GP 4    80767641381 HC PT ELECTRICAL STIM UNATTENDED 6/8/2021 Aaron Eller, PT  1                    Aaron Eller, PT  6/8/2021

## 2021-06-09 ENCOUNTER — OFFICE VISIT (OUTPATIENT)
Dept: PODIATRY | Facility: CLINIC | Age: 36
End: 2021-06-09

## 2021-06-09 VITALS
OXYGEN SATURATION: 98 % | SYSTOLIC BLOOD PRESSURE: 125 MMHG | WEIGHT: 152 LBS | HEIGHT: 70 IN | HEART RATE: 83 BPM | DIASTOLIC BLOOD PRESSURE: 76 MMHG | BODY MASS INDEX: 21.76 KG/M2

## 2021-06-09 DIAGNOSIS — S93.324D DISLOCATION OF TARSOMETATARSAL JOINT OF RIGHT FOOT, SUBSEQUENT ENCOUNTER: Primary | ICD-10-CM

## 2021-06-09 PROCEDURE — 99024 POSTOP FOLLOW-UP VISIT: CPT | Performed by: PODIATRIST

## 2021-06-09 NOTE — PROGRESS NOTES
Andres Wallace  1985  35 y.o. male     06/09/2021     Chief Complaint   Patient presents with   • Right Foot - post op recheck       History of Present Illness    Andres Wallace is a 35 y.o.male who presents to clinic for his fifth postoperative visit.  He is ambulating in a cam boot relatively pain-free.  States he is progressing well with physical therapy.    History reviewed. No pertinent past medical history.      Past Surgical History:   Procedure Laterality Date   • ORIF FOOT FRACTURE Right 4/15/2021    Procedure: OPEN REDUCTION AND INTERNAL FIXATION OF RIGHT LISFRANC FRACTURE DISLOCATION;  Surgeon: Golden Maki DPM;  Location: [facility];  Service: Podiatry;  Laterality: Right;   • ORIF WRIST FRACTURE Left    • OTHER SURGICAL HISTORY      gastric surgery secondary to perforated ulcer         Family History   Problem Relation Age of Onset   • Diabetes Mother    • Cancer Maternal Grandfather    • Cancer Paternal Grandfather    • Diabetes Paternal Grandfather        No Known Allergies    Social History     Socioeconomic History   • Marital status:      Spouse name: Not on file   • Number of children: Not on file   • Years of education: Not on file   • Highest education level: Not on file   Tobacco Use   • Smoking status: Current Every Day Smoker     Packs/day: 1.00     Years: 19.00     Pack years: 19.00   • Smokeless tobacco: Current User     Types: Snuff   Vaping Use   • Vaping Use: Never used   Substance and Sexual Activity   • Alcohol use: Yes     Comment: socially   • Drug use: Not Currently   • Sexual activity: Defer         Current Outpatient Medications   Medication Sig Dispense Refill   • HYDROcodone-acetaminophen (Norco)  MG per tablet Take 1 tablet by mouth Every 8 (Eight) Hours As Needed for Moderate Pain . 42 tablet 0   • HYDROcodone-acetaminophen (Norco) 7.5-325 MG per tablet Take 1 tablet by mouth Every 12 (Twelve) Hours As Needed for Moderate Pain . 28 tablet 0   •  "oxyCODONE-acetaminophen (Percocet) 7.5-325 MG per tablet Take 1 tablet by mouth Every 6 (Six) Hours As Needed for Moderate Pain . 28 tablet 0     No current facility-administered medications for this visit.       Review of Systems   Constitutional: Negative.    HENT: Negative.    Eyes: Negative.    Respiratory: Negative.    Cardiovascular: Negative.    Gastrointestinal: Negative.    Musculoskeletal:             Skin: Negative.    Psychiatric/Behavioral: Negative.          OBJECTIVE    /76   Pulse 83   Ht 177.8 cm (70\")   Wt 68.9 kg (152 lb)   SpO2 98%   BMI 21.81 kg/m²     Physical Exam  Vitals reviewed.   Constitutional:       General: He is not in acute distress.     Appearance: He is well-developed.   HENT:      Head: Normocephalic and atraumatic.      Nose: Nose normal.   Eyes:      Conjunctiva/sclera: Conjunctivae normal.      Pupils: Pupils are equal, round, and reactive to light.   Pulmonary:      Effort: Pulmonary effort is normal. No respiratory distress.      Breath sounds: No wheezing.   Musculoskeletal:         General: Swelling present.   Skin:     General: Skin is warm.      Capillary Refill: Capillary refill takes less than 2 seconds.   Neurological:      Mental Status: He is alert and oriented to person, place, and time.   Psychiatric:         Behavior: Behavior normal.         Thought Content: Thought content normal.          Right Lower Extremity Exam: Incision sites healed.  Mild edema right foot.  CFT immediate to distal digits.  Sensation intact to light touch.  Muscle strength is slightly decreased.  Ankle joint range of motion is within normal limits.  First MTP range of motion is slightly decreased.  TMT J range of motion slightly decreased with tenderness.      Foot/Ankle Exam        Procedures    ASSESSMENT AND PLAN    Diagnoses and all orders for this visit:    1. Dislocation of tarsometatarsal joint of right foot, subsequent encounter (Primary)          -Patient doing well " postoperatively.  Disp transition out of cam boot into athletic shoe gear.  Advance physical therapy as tolerated.  Plan communicated to physical therapy.  All his questions were answered.  Recheck 4 weeks          This document has been electronically signed by Golden Maki DPM on Yashira 10, 2021 09:18 CDT     6/10/2021  09:18 CDT

## 2021-06-10 ENCOUNTER — HOSPITAL ENCOUNTER (OUTPATIENT)
Dept: PHYSICAL THERAPY | Facility: HOSPITAL | Age: 36
Setting detail: THERAPIES SERIES
Discharge: HOME | End: 2021-06-10

## 2021-06-10 DIAGNOSIS — S93.324D DISLOCATION OF TARSOMETATARSAL JOINT OF RIGHT FOOT, SUBSEQUENT ENCOUNTER: Primary | ICD-10-CM

## 2021-06-10 PROCEDURE — 97110 THERAPEUTIC EXERCISES: CPT | Performed by: PHYSICAL THERAPIST

## 2021-06-10 NOTE — THERAPY TREATMENT NOTE
Outpatient Physical Therapy Ortho Treatment Note  [facility]     Patient Name: Andres Wallace  : 1985  MRN: [id number]  Today's Date: 6/10/2021      Visit Date: 06/10/2021  Subjective improvement 0  Visits 5/5  Visits approved 10  RTND   Recert Date 2021     S/P R ORIF R transmetatarsal on 4-    Visit Dx:    ICD-10-CM ICD-9-CM   1. Dislocation of tarsometatarsal joint of right foot, subsequent encounter  S93.324D V58.89       Patient Active Problem List   Diagnosis   • Dislocation of tarsometatarsal joint of right foot        No past medical history on file.     Past Surgical History:   Procedure Laterality Date   • ORIF FOOT FRACTURE Right 4/15/2021    Procedure: OPEN REDUCTION AND INTERNAL FIXATION OF RIGHT LISFRANC FRACTURE DISLOCATION;  Surgeon: Golden Maki DPM;  Location: [facility];  Service: Podiatry;  Laterality: Right;   • ORIF WRIST FRACTURE Left    • OTHER SURGICAL HISTORY      gastric surgery secondary to perforated ulcer       PT Ortho     Row Name 06/10/21 08       Subjective Comments    Subjective Comments  Patient reports walking extensively yesterday, mostly painful with the R lateral ankle, no pain with the dorsum of the R foot along the 1st metatarsal region.  -BS       Precautions and Contraindications    Precautions  walking boot d/c'd by Dr. Maki  -BS       Subjective Pain    Able to rate subjective pain?  yes  -BS    Pre-Treatment Pain Level  2  -BS    Post-Treatment Pain Level  2  -BS    Subjective Pain Comment  R lat ankle pain  -BS       Posture/Observations    Posture/Observations Comments  ambulating without boot  -BS      User Key  (r) = Recorded By, (t) = Taken By, (c) = Cosigned By    Initials Name Provider Type    Aaron Driscoll PT Physical Therapist                      PT Assessment/Plan     Row Name 06/10/21 0800          PT Assessment    Assessment Comments  Good tolerance to tx. Minimal change in baseline pain post tx.  -BS         "PT Plan    PT Frequency  2x/week  -BS     Predicted Duration of Therapy Intervention (PT)  6-8 weeks  -BS     PT Plan Comments  green TB with 4 way ankle.   -BS       User Key  (r) = Recorded By, (t) = Taken By, (c) = Cosigned By    Initials Name Provider Type    Aaron Driscoll PT Physical Therapist          Modalities     Row Name 06/10/21 0800             Ice    Ice Applied  Yes 11'  -BS      Location  R foot/ankle  -BS      Ice S/P Rx  Yes  -BS         ELECTRICAL STIMULATION    Attended/Unattended  --  -BS      Stimulation Type  --  -BS      Location/Electrode Placement/Other  --  -BS      PT E-Stim Unattended Minutes  --  -BS        User Key  (r) = Recorded By, (t) = Taken By, (c) = Cosigned By    Initials Name Provider Type    Aaron Driscoll, PT Physical Therapist        OP Exercises     Row Name 06/10/21 0800             Subjective Comments    Subjective Comments  Patient reports walking extensively yesterday, mostly painful with the R lateral ankle, no pain with the dorsum of the R foot along the 1st metatarsal region.  -BS         Subjective Pain    Able to rate subjective pain?  yes  -BS      Pre-Treatment Pain Level  2  -BS      Post-Treatment Pain Level  2  -BS      Subjective Pain Comment  R lat ankle pain  -BS         Exercise 1    Exercise Name 1  fwd lunge S  -BS      Sets 1  1  -BS      Reps 1  3  -BS      Time 1  30\" HOLD  -BS         Exercise 2    Exercise Name 2  Pro II, level 1  -BS      Reps 2  10'  -BS         Exercise 3    Exercise Name 3  standing HR/TR  -BS      Sets 3  1  -BS      Reps 3  10 ea  -BS         Exercise 4    Exercise Name 4  incline board S-gastrocnemiud  -BS      Sets 4  1  -BS      Reps 4  3  -BS      Time 4  30\" hold  -BS         Exercise 5    Exercise Name 5  soleus S on incline board  -BS      Sets 5  1  -BS      Reps 5  3  -BS      Time 5  30\" hold  -BS         Exercise 6    Exercise Name 6  seated rocker board DF/PF  -BS      Sets 6  2  -BS      Reps 6  10  -BS   " "      Exercise 7    Exercise Name 7  deep squats  -BS      Sets 7  1  -BS      Reps 7  15  -BS         Exercise 8    Exercise Name 8  see modalities  -BS         Exercise 9    Exercise Name 9  step ups-fwd/lat on 6\" step  -BS      Sets 9  1  -BS      Reps 9  10 ea  -BS      Additional Comments  leading with unaffected LE  -BS        User Key  (r) = Recorded By, (t) = Taken By, (c) = Cosigned By    Initials Name Provider Type    BS Aaron Eller, PT Physical Therapist                       PT OP Goals     Row Name 06/10/21 0800          PT Short Term Goals    STG Date to Achieve  06/10/21  -BS     STG 1  Patient independent with HEP for the R foot/ankle  -BS     STG 1 Progress  Not Met  -BS     STG 2  Improve R ankle DF/PF to 2°/35°  -BS     STG 2 Progress  Not Met  -BS     STG 3  Improve R ankle MMT to 5/5  -BS     STG 3 Progress  Not Met  -BS     STG 4  Reduce R foot pain by 50% with standing and walking tasks  -BS     STG 4 Progress  Not Met  -BS     STG 5  Improve R ankle inv/aly AROM to 35° and 13°  -BS     STG 5 Progress  Not Met  -BS        Long Term Goals    LTG Date to Achieve  06/24/21  -BS     LTG 1  Improve R ankle DF/PF to 6°/40°  -BS     LTG 1 Progress  Not Met  -BS     LTG 2  Reduce R foot pain by 75% with standing and walking tasks  -BS     LTG 2 Progress  Not Met  -BS     LTG 3  Able to walk without walking boot with equal weight bearing (R=L) with non-antalgic gait  -BS     LTG 3 Progress  Not Met  -BS        Time Calculation    PT Goal Re-Cert Due Date  06/17/21  -BS       User Key  (r) = Recorded By, (t) = Taken By, (c) = Cosigned By    Initials Name Provider Type    Aaron Driscoll, PT Physical Therapist                         Time Calculation:   Start Time: 0804  Stop Time: 0855  Time Calculation (min): 51 min  PT Non-Billable Time (min): 11 min  Total Timed Code Minutes- PT: 40 minute(s)  Therapy Charges for Today     Code Description Service Date Service Provider Modifiers Qty    " 96150876211  PT THER PROC EA 15 MIN 6/10/2021 Aaron Eller, PT GP 3    42445259646 HC PT THER SUPP EA 15 MIN 6/10/2021 Aaron Eller, PT GP 1                    Aaron Eller, PT  6/10/2021

## 2021-06-15 ENCOUNTER — HOSPITAL ENCOUNTER (OUTPATIENT)
Dept: PHYSICAL THERAPY | Facility: HOSPITAL | Age: 36
Setting detail: THERAPIES SERIES
Discharge: HOME | End: 2021-06-15

## 2021-06-15 DIAGNOSIS — S93.324D DISLOCATION OF TARSOMETATARSAL JOINT OF RIGHT FOOT, SUBSEQUENT ENCOUNTER: Primary | ICD-10-CM

## 2021-06-15 PROCEDURE — 97110 THERAPEUTIC EXERCISES: CPT

## 2021-06-15 PROCEDURE — G0283 ELEC STIM OTHER THAN WOUND: HCPCS

## 2021-06-15 PROCEDURE — 97530 THERAPEUTIC ACTIVITIES: CPT

## 2021-06-15 NOTE — THERAPY TREATMENT NOTE
Outpatient Physical Therapy Ortho Treatment Note  [facility]     Patient Name: Andres Wallace  : 1985  MRN: [id number]  Today's Date: 6/15/2021      Visit Date: 06/15/2021     Subjective Improvement a little better  Visits 6/6  Visits paproved 10  RTMD 2021  Recert Date 2021    S/P R ORIF R Transmetatarsal; pm 4-    Visit Dx:    ICD-10-CM ICD-9-CM   1. Dislocation of tarsometatarsal joint of right foot, subsequent encounter  S93.324D V58.89       Patient Active Problem List   Diagnosis   • Dislocation of tarsometatarsal joint of right foot        No past medical history on file.     Past Surgical History:   Procedure Laterality Date   • ORIF FOOT FRACTURE Right 4/15/2021    Procedure: OPEN REDUCTION AND INTERNAL FIXATION OF RIGHT LISFRANC FRACTURE DISLOCATION;  Surgeon: Golden Maki DPM;  Location: [facility];  Service: Podiatry;  Laterality: Right;   • ORIF WRIST FRACTURE Left    • OTHER SURGICAL HISTORY      gastric surgery secondary to perforated ulcer       PT Ortho     Row Name 06/15/21 0800       Subjective Comments    Subjective Comments  Patient states his foot is a little sore today.  He has been trying to break in a new pair of shoes that has an arch support  -CP       Precautions and Contraindications    Precautions  no restriction advance as tolerated per MD note  -CP       Subjective Pain    Able to rate subjective pain?  yes  -CP    Pre-Treatment Pain Level  2  -CP       Posture/Observations    Posture/Observations Comments  ambulating without boot  -CP      User Key  (r) = Recorded By, (t) = Taken By, (c) = Cosigned By    Initials Name Provider Type    Charlotte Juan, PTA Physical Therapy Assistant                      PT Assessment/Plan     Row Name 06/15/21 5254          PT Assessment    Assessment Comments  Very good tolerance to CKC ther ex.  Decrease edema noted in right ankle this date.  Patient is TTP to right lat ankle.  Patient was 10 minutes  late for appointment  -CP        PT Plan    PT Frequency  2x/week  -CP     Predicted Duration of Therapy Intervention (PT)  6-8 weeks  -CP     PT Plan Comments  Cont with POC.  SL stance with ball toss on rebounder.  -CP       User Key  (r) = Recorded By, (t) = Taken By, (c) = Cosigned By    Initials Name Provider Type    CP Charlotte Bianchi, NAYE Physical Therapy Assistant          Modalities     Row Name 06/15/21 0800             Ice    Ice Applied  Yes  -CP      Location  right ankle and foot with IFC  -CP      PT Ice Rx Minutes  15  -CP      Ice S/P Rx  Yes  -CP         ELECTRICAL STIMULATION    Attended/Unattended  Unattended  -CP      Stimulation Type  IFC  -CP      Location/Electrode Placement/Other  R ankle and foot with ice  -CP      PT E-Stim Unattended Minutes  15  -CP        User Key  (r) = Recorded By, (t) = Taken By, (c) = Cosigned By    Initials Name Provider Type    Charlotte Juan, PTA Physical Therapy Assistant        OP Exercises     Row Name 06/15/21 1022 06/15/21 0800          Subjective Comments    Subjective Comments  --  Patient states his foot is a little sore today.  He has been trying to break in a new pair of shoes that has an arch support  -CP        Subjective Pain    Able to rate subjective pain?  --  yes  -CP     Pre-Treatment Pain Level  --  2  -CP     Post-Treatment Pain Level  --  -- numb from ice  -CP        Total Minutes    40620 - PT Therapeutic Exercise Minutes  45  -CP  --     46716 - PT Therapeutic Activity Minutes  15  -CP  --        Exercise 1    Exercise Name 1  --  Pro II level 3  -CP     Time 1  --  10  -CP     Additional Comments  --  UE/LE  -CP        Exercise 2    Exercise Name 2  --  incline stretch  -CP     Cueing 2  --  Verbal  -CP     Sets 2  --  3  -CP     Time 2  --  30  -CP        Exercise 3    Exercise Name 3  --  soleus stretch  -CP     Cueing 3  --  Verbal;Demo  -CP     Sets 3  --  3  -CP     Time 3  --  30  -CP        Exercise 4    Exercise Name 4  --   "step up 4\"  -CP     Cueing 4  --  Verbal;Demo  -CP     Sets 4  --  2  -CP     Reps 4  --  10  -CP        Exercise 5    Exercise Name 5  --  lat step up 4\"  -CP     Cueing 5  --  Verbal;Demo  -CP     Sets 5  --  2  -CP     Reps 5  --  10  -CP        Exercise 6    Exercise Name 6  --  heel toe gait  -CP     Cueing 6  --  Verbal;Demo  -CP     Reps 6  --  5  -CP        Exercise 7    Exercise Name 7  --  side stepping   -CP     Cueing 7  --  Verbal;Demo  -CP     Reps 7  --  5  -CP        Exercise 8    Exercise Name 8  --  SL stance  -CP     Cueing 8  --  Verbal;Demo  -CP     Reps 8  --  3  -CP     Time 8  --  max 30\"  -CP        Exercise 9    Exercise Name 9  --  up and down ramp  -CP     Cueing 9  --  Verbal;Demo  -CP     Reps 9  --  5  -CP     Time 9  --  forward and backward  -CP        Exercise 10    Exercise Name 10  --  marble   -CP     Cueing 10  --  Verbal  -CP     Reps 10  --  2  -CP        Exercise 11    Exercise Name 11  --  seated heelslides on towel for DF  -CP     Cueing 11  --  Verbal;Tactile  -CP     Reps 11  --  20  -CP        Exercise 12    Exercise Name 12  --  ankle 4 way with tband  -CP     Cueing 12  --  Verbal;Tactile  -CP     Reps 12  --  1  -CP     Time 12  --  10  -CP     Additional Comments  --  green  -CP        Exercise 13    Exercise Name 13  --  see manual  -CP       User Key  (r) = Recorded By, (t) = Taken By, (c) = Cosigned By    Initials Name Provider Type    CP Charlotte Bianchi PTA Physical Therapy Assistant                      Manual Rx (last 36 hours)      Manual Treatments     Row Name 06/15/21 0900             Manual Rx 1    Manual Rx 1 Location  right lat ankle  -CP      Manual Rx 1 Type  MFR/Cross friction massage  -CP      Manual Rx 1 Duration  5  -CP        User Key  (r) = Recorded By, (t) = Taken By, (c) = Cosigned By    Initials Name Provider Type    CP Charlotte Bianchi PTA Physical Therapy Assistant          PT OP Goals     Row Name 06/15/21 0900          PT Short " Term Goals    STG Date to Achieve  06/10/21  -CP     STG 1  Patient independent with HEP for the R foot/ankle  -CP     STG 1 Progress  Not Met  -CP     STG 2  Improve R ankle DF/PF to 2°/35°  -CP     STG 2 Progress  Not Met  -CP     STG 3  Improve R ankle MMT to 5/5  -CP     STG 3 Progress  Not Met  -CP     STG 4  Reduce R foot pain by 50% with standing and walking tasks  -CP     STG 4 Progress  Not Met  -CP     STG 5  Improve R ankle inv/aly AROM to 35° and 13°  -CP     STG 5 Progress  Not Met  -CP        Long Term Goals    LTG Date to Achieve  06/24/21  -CP     LTG 1  Improve R ankle DF/PF to 6°/40°  -CP     LTG 1 Progress  Not Met  -CP     LTG 2  Reduce R foot pain by 75% with standing and walking tasks  -CP     LTG 2 Progress  Not Met  -CP     LTG 3  Able to walk without walking boot with equal weight bearing (R=L) with non-antalgic gait  -CP     LTG 3 Progress  Not Met  -CP        Time Calculation    PT Goal Re-Cert Due Date  06/17/21  -CP       User Key  (r) = Recorded By, (t) = Taken By, (c) = Cosigned By    Initials Name Provider Type    CP Charlotte Bianchi PTA Physical Therapy Assistant                         Time Calculation:   Start Time: 0855  Stop Time: 1015  Time Calculation (min): 80 min  Total Timed Code Minutes- PT: 60 minute(s)  Timed Charges  91461 - PT Therapeutic Exercise Minutes: 45  84095 - PT Therapeutic Activity Minutes: 15  Untimed Charges  PT E-Stim Unattended Minutes: 15  PT Ice Rx Minutes: 15  Total Minutes  Timed Charges Total Minutes: 60  Untimed Charges Total Minutes: 30   Total Minutes: 60  Therapy Charges for Today     Code Description Service Date Service Provider Modifiers Qty    94793747201 HC PT ELECTRICAL STIM UNATTENDED 6/15/2021 Charlotte Bianchi PTA  1    24523672824 HC PT THER PROC EA 15 MIN 6/15/2021 Charlotte Bianchi, PTA GP 3    23249895977 HC PT THERAPEUTIC ACT EA 15 MIN 6/15/2021 Charlotte Bianchi PTA GP 1                    Charlotte Bianchi PTA  6/15/2021

## 2021-06-22 ENCOUNTER — HOSPITAL ENCOUNTER (OUTPATIENT)
Dept: PHYSICAL THERAPY | Facility: HOSPITAL | Age: 36
Setting detail: THERAPIES SERIES
Discharge: HOME | End: 2021-06-22

## 2021-06-22 DIAGNOSIS — S93.324D DISLOCATION OF TARSOMETATARSAL JOINT OF RIGHT FOOT, SUBSEQUENT ENCOUNTER: Primary | ICD-10-CM

## 2021-06-22 PROCEDURE — 97112 NEUROMUSCULAR REEDUCATION: CPT | Performed by: PHYSICAL THERAPIST

## 2021-06-22 PROCEDURE — 97110 THERAPEUTIC EXERCISES: CPT | Performed by: PHYSICAL THERAPIST

## 2021-06-22 NOTE — THERAPY PROGRESS REPORT/RE-CERT
Outpatient Physical Therapy Ortho Progress Note  [facility]     Patient Name: Andres Wallace  : 1985  MRN: [id number]  Today's Date: 2021      Visit Date: 2021  Subjective Improvement 70%  Visits 7/  Visits paproved 10  RTMD 2021  Recert Date 2021     S/P R ORIF R Transmetatarsal; pm 4-  Visit Dx:    ICD-10-CM ICD-9-CM   1. Dislocation of tarsometatarsal joint of right foot, subsequent encounter  S93.324D V58.89       Patient Active Problem List   Diagnosis   • Dislocation of tarsometatarsal joint of right foot        No past medical history on file.     Past Surgical History:   Procedure Laterality Date   • ORIF FOOT FRACTURE Right 4/15/2021    Procedure: OPEN REDUCTION AND INTERNAL FIXATION OF RIGHT LISFRANC FRACTURE DISLOCATION;  Surgeon: Golden Maki DPM;  Location: [facility];  Service: Podiatry;  Laterality: Right;   • ORIF WRIST FRACTURE Left    • OTHER SURGICAL HISTORY      gastric surgery secondary to perforated ulcer       PT Ortho     Row Name 21 0853       Subjective Comments    Subjective Comments  70% improvement thus far, just concerned about ankle pain at this point.   -BS       Precautions and Contraindications    Precautions/Limitations  no known precautions/limitations  -BS    Precautions  no restriction advance as tolerated per MD note  -BS       Subjective Pain    Able to rate subjective pain?  yes  -BS    Pre-Treatment Pain Level  1  -BS    Post-Treatment Pain Level  1  -BS    Subjective Pain Comment  R lateral ankle  -BS       General ROM    GENERAL ROM COMMENTS  AROM: R ankle-DF 8° PF 30° inv 30° aly 14°    -BS       MMT (Manual Muscle Testing)    General MMT Comments  MMT: R ankle-DF 5/5 PF 5/5 inv 5/5 aly 5/5   -BS       Sensation    Light Touch  Partial deficits in the RLE numb R dorsum R foot  -BS       Balance Skills Training    SLS  30 sec on each LE, severe R ankle instability noted w/ R LE, no instability noted with L LE  -BS     Balance Comments  slight pain noted in R lat ankle with R LE only while perforning SLS, standing B HR x 20 reps with rigid R forefoot noted.   -BS      User Key  (r) = Recorded By, (t) = Taken By, (c) = Cosigned By    Initials Name Provider Type    Aaron Driscoll, PT Physical Therapist                      PT Assessment/Plan     Row Name 06/22/21 0853          PT Assessment    Assessment Comments  Pt is progressing toward goals, limited by slight R ankle discomfort. Work on eccentric loading of L ankle, ankle stability with higher level tasks.   -BS        PT Plan    PT Frequency  2x/week  -BS     Predicted Duration of Therapy Intervention (PT)  6-8 weeks  -BS     PT Plan Comments  advance weight bearing/loading as able on the R LE. Bounding and plyometric tasks. Add 553's   -BS       User Key  (r) = Recorded By, (t) = Taken By, (c) = Cosigned By    Initials Name Provider Type    Aaron Driscoll, PT Physical Therapist          Modalities     Row Name 06/22/21 0853             Ice    Ice Applied  No pt deferred ice or estim  -BS        User Key  (r) = Recorded By, (t) = Taken By, (c) = Cosigned By    Initials Name Provider Type    Aaron Driscoll, PT Physical Therapist        OP Exercises     Row Name 06/22/21 0869             Subjective Comments    Subjective Comments  70% improvement thus far, just concerned about ankle pain at this point.   -BS         Subjective Pain    Able to rate subjective pain?  yes  -BS      Pre-Treatment Pain Level  1  -BS      Post-Treatment Pain Level  1  -BS      Subjective Pain Comment  R lateral ankle  -BS         Exercise 1    Exercise Name 1  Pro II level 3  -BS      Time 1  10  -BS         Exercise 2    Exercise Name 2  incline stretch  -BS      Sets 2  3  -BS      Time 2  30  -BS         Exercise 3    Exercise Name 3  soleus stretch  -BS      Sets 3  3  -BS      Time 3  30  -BS         Exercise 4    Exercise Name 4  ball toss (2#) against rebounder while maintaining SLR  on R LE   -BS      Time 4  3'  -BS         Exercise 5    Exercise Name 5  sidestepping on foam balance beam  -BS      Time 5  3 trips  -BS         Exercise 6    Exercise Name 6  standing HR/TR  -BS      Sets 6  1  -BS      Reps 6  20  -BS      Time 6     -BS        User Key  (r) = Recorded By, (t) = Taken By, (c) = Cosigned By    Initials Name Provider Type    BS Aaron Eller, PT Physical Therapist                       PT OP Goals     Row Name 06/22/21 0853          PT Short Term Goals    STG Date to Achieve  07/06/21  -BS     STG 1  Patient independent with HEP for the R foot/ankle  -BS     STG 1 Progress  Not Met  -BS     STG 2  Improve R ankle DF/PF to 2°/35°  -BS     STG 2 Progress  Partially Met  -BS     STG 3  Improve R ankle MMT to 5/5  -BS     STG 3 Progress  Met  -BS     STG 4  Reduce R foot pain by 50% with standing and walking tasks  -BS     STG 4 Progress  Ongoing;Progressing  -BS     STG 5  Improve R ankle inv/aly AROM to 35° and 13°  -BS     STG 5 Progress  Partially Met  -BS        Long Term Goals    LTG Date to Achieve  07/20/21  -BS     LTG 1  Improve R ankle DF/PF to 6°/40°  -BS     LTG 1 Progress  Partially Met  -BS     LTG 2  Reduce R foot pain by 75% with standing and walking tasks  -BS     LTG 2 Progress  Ongoing;Progressing  -BS     LTG 3  Able to walk without walking boot with equal weight bearing (R=L) with non-antalgic gait  -BS     LTG 3 Progress  Ongoing;Progressing  -BS        Time Calculation    PT Goal Re-Cert Due Date  07/13/21  -BS       User Key  (r) = Recorded By, (t) = Taken By, (c) = Cosigned By    Initials Name Provider Type    BS Aaron Eller, PT Physical Therapist                         Time Calculation:   Start Time: 0853  Stop Time: 0934  Time Calculation (min): 41 min  Total Timed Code Minutes- PT: 41 minute(s)  Therapy Charges for Today     Code Description Service Date Service Provider Modifiers Qty    35662194266  PT THER PROC EA 15 MIN 6/22/2021 Aaron Eller,  PT GP 1    [id number]  PT NEUROMUSC RE EDUCATION EA 15 MIN 6/22/2021 Aaron Eller, PT GP 2                    Aaron Eller, PT  6/22/2021

## 2021-06-24 ENCOUNTER — HOSPITAL ENCOUNTER (OUTPATIENT)
Dept: PHYSICAL THERAPY | Facility: HOSPITAL | Age: 36
Setting detail: THERAPIES SERIES
Discharge: HOME | End: 2021-06-24

## 2021-06-24 DIAGNOSIS — S93.324D DISLOCATION OF TARSOMETATARSAL JOINT OF RIGHT FOOT, SUBSEQUENT ENCOUNTER: Primary | ICD-10-CM

## 2021-06-24 PROCEDURE — 97110 THERAPEUTIC EXERCISES: CPT

## 2021-06-24 NOTE — THERAPY TREATMENT NOTE
"    Outpatient Physical Therapy Ortho Treatment Note  [facility]     Patient Name: Andres Wallace  : 1985  MRN: [id number]  Today's Date: 2021      Visit Date: 2021   Attendance:    Subjective improvement:75-80%  Recert: 21  MD Appointment: 21      Visit Dx:    ICD-10-CM ICD-9-CM   1. Dislocation of tarsometatarsal joint of right foot, subsequent encounter  S93.324D V58.89       Patient Active Problem List   Diagnosis   • Dislocation of tarsometatarsal joint of right foot        No past medical history on file.     Past Surgical History:   Procedure Laterality Date   • ORIF FOOT FRACTURE Right 4/15/2021    Procedure: OPEN REDUCTION AND INTERNAL FIXATION OF RIGHT LISFRANC FRACTURE DISLOCATION;  Surgeon: Golden Maki DPM;  Location: [facility];  Service: Podiatry;  Laterality: Right;   • ORIF WRIST FRACTURE Left    • OTHER SURGICAL HISTORY      gastric surgery secondary to perforated ulcer       PT Ortho     Row Name 21 0900       Subjective Comments    Subjective Comments  Pt feels he is doing well. \"It feels like a bad sprain now\" Pt reports a 75-80% improvement.  -EM       Precautions and Contraindications    Precautions/Limitations  no known precautions/limitations  -EM    Precautions  no restriction advance as tolerated per MD note  -EM       Subjective Pain    Able to rate subjective pain?  yes  -EM    Pre-Treatment Pain Level  2  -EM       Posture/Observations    Posture/Observations Comments  Pt amb Ind with mild antalgic gt with toe off  -EM      User Key  (r) = Recorded By, (t) = Taken By, (c) = Cosigned By    Initials Name Provider Type    EM Heriberto Saldana, PTA Physical Therapy Assistant                      PT Assessment/Plan     Row Name 21 0900          PT Assessment    Assessment Comments  Pt stanley tx well with progressed balance and therex. Pt is progressing very well with balance and proprioception as expected at this time.  -EM        PT Plan    " "PT Frequency  2x/week  -EM     Predicted Duration of Therapy Intervention (PT)  6-8 wks  -EM     PT Plan Comments  Warmup on elliptical next tx as stanley. Cont to progress as stanley for functional strengthening and balance.   -EM       User Key  (r) = Recorded By, (t) = Taken By, (c) = Cosigned By    Initials Name Provider Type    EM Heriberto Saldana, NAYE Physical Therapy Assistant          Modalities     Row Name 06/24/21 0900             Ice    Patient denies application of Ice  Yes  -EM      Patient reports will apply ice at home to involved area  Yes  -EM        User Key  (r) = Recorded By, (t) = Taken By, (c) = Cosigned By    Initials Name Provider Type    EM Heriberto Saldana, NAYE Physical Therapy Assistant        OP Exercises     Row Name 06/24/21 0900             Subjective Comments    Subjective Comments  Pt feels he is doing well. \"It feels like a bad sprain now\" Pt reports a 75-80% improvement.  -EM         Subjective Pain    Able to rate subjective pain?  yes  -EM      Pre-Treatment Pain Level  2  -EM      Post-Treatment Pain Level  4  -EM         Exercise 1    Exercise Name 1  PRO II-Twin Peak-4.0  -EM      Time 1  10'  -EM         Exercise 2    Exercise Name 2  Incline Calf S: Gastroc/Soleus  -EM      Sets 2  3  -EM      Time 2  30\"  -EM         Exercise 3    Exercise Name 3  Offstep CR  -EM      Sets 3  1  -EM      Reps 3  20  -EM         Exercise 4    Exercise Name 4  St TR  -EM      Sets 4  1  -EM      Reps 4  20  -EM         Exercise 5    Exercise Name 5  St CR: IR, ER  -EM      Sets 5  1  -EM      Reps 5  20  -EM         Exercise 6    Exercise Name 6  SLS on BOSU  -EM      Sets 6  1  -EM      Reps 6  3  -EM      Additional Comments  21\", 35\",38\"  -EM         Exercise 7    Exercise Name 7  Wide Stance Squats replicate umpire stance  -EM      Sets 7  1  -EM      Reps 7  30  -EM         Exercise 8    Exercise Name 8  4 Way MERCEDES with TB  -EM      Sets 8  1  -EM      Reps 8  20  -EM      Additional Comments  GTB  " -EM         Exercise 9    Exercise Name 9  ice at home  -EM        User Key  (r) = Recorded By, (t) = Taken By, (c) = Cosigned By    Initials Name Provider Type    EM Heriberto Saldana, NAYE Physical Therapy Assistant                       PT OP Goals     Row Name 06/24/21 0900          PT Short Term Goals    STG Date to Achieve  07/06/21  -EM     STG 1  Patient independent with HEP for the R foot/ankle  -EM     STG 1 Progress  Not Met  -EM     STG 2  Improve R ankle DF/PF to 2°/35°  -EM     STG 2 Progress  Partially Met  -EM     STG 3  Improve R ankle MMT to 5/5  -EM     STG 3 Progress  (S) Met  -EM     STG 4  Reduce R foot pain by 50% with standing and walking tasks  -EM     STG 4 Progress  Ongoing;Progressing  -EM     STG 5  Improve R ankle inv/aly AROM to 35° and 13°  -EM     STG 5 Progress  Partially Met  -EM        Long Term Goals    LTG Date to Achieve  07/20/21  -EM     LTG 1  Improve R ankle DF/PF to 6°/40°  -EM     LTG 1 Progress  Partially Met  -EM     LTG 2  Reduce R foot pain by 75% with standing and walking tasks  -EM     LTG 2 Progress  Ongoing;Progressing  -EM     LTG 3  Able to walk without walking boot with equal weight bearing (R=L) with non-antalgic gait  -EM     LTG 3 Progress  Ongoing;Progressing  -EM        Time Calculation    PT Goal Re-Cert Due Date  07/13/21  -EM       User Key  (r) = Recorded By, (t) = Taken By, (c) = Cosigned By    Initials Name Provider Type    EM Heriberto Saldana PTA Physical Therapy Assistant                         Time Calculation:   Start Time: 0848  Stop Time: 0928  Time Calculation (min): 40 min  Total Timed Code Minutes- PT: 40 minute(s)  Therapy Charges for Today     Code Description Service Date Service Provider Modifiers Qty    27629653166 HC PT THER PROC EA 15 MIN 6/24/2021 Heriberto Saldana, NAYE GP 3                    Heriberto Saldana PTA  6/24/2021

## 2021-06-29 ENCOUNTER — HOSPITAL ENCOUNTER (OUTPATIENT)
Dept: PHYSICAL THERAPY | Facility: HOSPITAL | Age: 36
Setting detail: THERAPIES SERIES
Discharge: HOME | End: 2021-06-29

## 2021-06-29 DIAGNOSIS — S93.324D DISLOCATION OF TARSOMETATARSAL JOINT OF RIGHT FOOT, SUBSEQUENT ENCOUNTER: Primary | ICD-10-CM

## 2021-06-29 PROCEDURE — 97110 THERAPEUTIC EXERCISES: CPT | Performed by: PHYSICAL THERAPIST

## 2021-06-29 PROCEDURE — 97112 NEUROMUSCULAR REEDUCATION: CPT | Performed by: PHYSICAL THERAPIST

## 2021-06-29 NOTE — THERAPY TREATMENT NOTE
Outpatient Physical Therapy Ortho Treatment Note  [facility]     Patient Name: Andres Wallace  : 1985  MRN: [id number]  Today's Date: 2021      Visit Date: 2021  Attendance:   Subjective improvement:80-85%  Recert: 21  MD Appointment: 21    Visit Dx:    ICD-10-CM ICD-9-CM   1. Dislocation of tarsometatarsal joint of right foot, subsequent encounter  S93.324D V58.89       Patient Active Problem List   Diagnosis   • Dislocation of tarsometatarsal joint of right foot        No past medical history on file.     Past Surgical History:   Procedure Laterality Date   • ORIF FOOT FRACTURE Right 4/15/2021    Procedure: OPEN REDUCTION AND INTERNAL FIXATION OF RIGHT LISFRANC FRACTURE DISLOCATION;  Surgeon: Golden Maki DPM;  Location: [facility];  Service: Podiatry;  Laterality: Right;   • ORIF WRIST FRACTURE Left    • OTHER SURGICAL HISTORY      gastric surgery secondary to perforated ulcer       PT Ortho     Row Name 21 1000       Precautions and Contraindications    Precautions/Limitations  no known precautions/limitations  -BS    Precautions  no restriction advance as tolerated per MD note  -BS       Subjective Pain    Post-Treatment Pain Level  2  -BS      User Key  (r) = Recorded By, (t) = Taken By, (c) = Cosigned By    Initials Name Provider Type    Aaron Driscoll, PT Physical Therapist                      PT Assessment/Plan     Row Name 21 1015          PT Assessment    Assessment Comments  Slight increase in R foot pain post tx. Improved R foot/ankle stabilty.  -BS        PT Plan    PT Frequency  2x/week  -BS     Predicted Duration of Therapy Intervention (PT)  6-8 wks  -BS     PT Plan Comments  continue per plan, advance ankle stability as able. Re-check tolerance to 553's, SLS holding bodyblade on/off Airex.   -BS       User Key  (r) = Recorded By, (t) = Taken By, (c) = Cosigned By    Initials Name Provider Type    Aaron Driscoll, PT Physical  "Therapist            OP Exercises     Row Name 06/29/21 1000             Subjective Comments    Subjective Comments  80-85% improvement.   -BS         Subjective Pain    Able to rate subjective pain?  yes  -BS      Pre-Treatment Pain Level  0  -BS      Post-Treatment Pain Level  2  -BS         Exercise 1    Exercise Name 1  PRO II-Twin Peak-4.0  -BS      Time 1  10'  -BS         Exercise 2    Exercise Name 2  Incline Calf S: Gastroc/Soleus  -BS      Sets 2  3  -BS      Time 2  30\"  -BS         Exercise 3    Exercise Name 3  fwd lunge S  -BS      Sets 3  1  -BS      Reps 3  3  -BS      Time 3  30\" hold  -BS      Additional Comments  R only  -BS         Exercise 4    Exercise Name 4  553's  -BS      Reps 4  5 reps  -BS         Exercise 5    Exercise Name 5  standing HR  -BS      Sets 5  1  -BS      Reps 5  20  -BS         Exercise 6    Exercise Name 6  eccentric step downs, 6\"  -BS      Sets 6  1  -BS      Reps 6  10  -BS         Exercise 7    Exercise Name 7  SLS on BOSU  -BS      Reps 7  on R LE  -BS      Time 7  42\", 18\", 25\"  -BS      Additional Comments  3 trials  -BS         Exercise 8    Exercise Name 8  4 Way MERCEDES with TB  -BS      Sets 8  1  -BS      Reps 8  20  -BS         Exercise 9    Exercise Name 9  ball toss (4#) against rebounder with SLS on R  -BS      Time 9  3' total  -BS         Exercise 10    Exercise Name 10  up/down ramp-bwd, sidestepping  -BS      Time 10  5-6 trials each  -BS         Exercise 11    Exercise Name 11  deferred ice, will ice at home  -BS        User Key  (r) = Recorded By, (t) = Taken By, (c) = Cosigned By    Initials Name Provider Type    BS Aaron Eller, PT Physical Therapist                       PT OP Goals     Row Name 06/29/21 1000          PT Short Term Goals    STG Date to Achieve  07/06/21  -BS     STG 1  Patient independent with HEP for the R foot/ankle  -BS     STG 1 Progress  Not Met  -BS     STG 2  Improve R ankle DF/PF to 2°/35°  -BS     STG 2 Progress  Partially " Met  -BS     STG 3  Improve R ankle MMT to 5/5  -BS     STG 3 Progress  Met  -BS     STG 4  Reduce R foot pain by 50% with standing and walking tasks  -BS     STG 4 Progress  Ongoing;Progressing  -BS     STG 5  Improve R ankle inv/aly AROM to 35° and 13°  -BS     STG 5 Progress  Partially Met  -BS        Long Term Goals    LTG Date to Achieve  07/20/21  -BS     LTG 1  Improve R ankle DF/PF to 6°/40°  -BS     LTG 1 Progress  Partially Met  -BS     LTG 2  Reduce R foot pain by 75% with standing and walking tasks  -BS     LTG 2 Progress  Ongoing;Progressing  -BS     LTG 3  Able to walk without walking boot with equal weight bearing (R=L) with non-antalgic gait  -BS     LTG 3 Progress  Ongoing;Progressing  -BS        Time Calculation    PT Goal Re-Cert Due Date  07/13/21  -       User Key  (r) = Recorded By, (t) = Taken By, (c) = Cosigned By    Initials Name Provider Type    BS Aaron Eller, PT Physical Therapist                         Time Calculation:   Start Time: 1015  Stop Time: 1058  Time Calculation (min): 43 min  Total Timed Code Minutes- PT: 43 minute(s)  Therapy Charges for Today     Code Description Service Date Service Provider Modifiers Qty    23924582094 HC PT THER PROC EA 15 MIN 6/29/2021 Aaron Eller, PT GP 2    [id number] HC PT NEUROMUSC RE EDUCATION EA 15 MIN 6/29/2021 Aaron Eller, PT GP 1                    Aaron Eller PT  6/29/2021

## 2021-07-01 ENCOUNTER — HOSPITAL ENCOUNTER (OUTPATIENT)
Dept: PHYSICAL THERAPY | Facility: HOSPITAL | Age: 36
Setting detail: THERAPIES SERIES
Discharge: HOME | End: 2021-07-01

## 2021-07-01 DIAGNOSIS — S93.324D DISLOCATION OF TARSOMETATARSAL JOINT OF RIGHT FOOT, SUBSEQUENT ENCOUNTER: Primary | ICD-10-CM

## 2021-07-01 PROCEDURE — 97110 THERAPEUTIC EXERCISES: CPT

## 2021-07-01 NOTE — THERAPY DISCHARGE NOTE
Outpatient Physical Therapy Ortho Treatment Note/Discharge Summary  [facility]     Patient Name: Andres Wallace  : 1985  MRN: [id number]  Today's Date: 2021      Visit Date: 2021     Subjective Improvement 90%  Visits 10/11  Visits approved 10  RTMD 2021  Recert Date 2021    S/P R ORIF transmetatarsal on 4-      Visit Dx:    ICD-10-CM ICD-9-CM   1. Dislocation of tarsometatarsal joint of right foot, subsequent encounter  S93.324D V58.89       Patient Active Problem List   Diagnosis   • Dislocation of tarsometatarsal joint of right foot        No past medical history on file.     Past Surgical History:   Procedure Laterality Date   • ORIF FOOT FRACTURE Right 4/15/2021    Procedure: OPEN REDUCTION AND INTERNAL FIXATION OF RIGHT LISFRANC FRACTURE DISLOCATION;  Surgeon: Golden Maki DPM;  Location: [facility];  Service: Podiatry;  Laterality: Right;   • ORIF WRIST FRACTURE Left    • OTHER SURGICAL HISTORY      gastric surgery secondary to perforated ulcer       PT Ortho     Row Name 21 0900       General ROM    RT Lower Ext  Rt Ankle Dorsiflexion;Rt Ankle Plantarflexion;Rt Ankle Inversion;Rt Ankle Eversion  -CP       Right Lower Ext    Rt Ankle Dorsiflexion AROM  12  -CP    Rt Ankle Plantarflexion AROM  60  -CP    Rt Ankle Inversion AROM  38  -CP    Rt Ankle Eversion AROM  15  -CP      User Key  (r) = Recorded By, (t) = Taken By, (c) = Cosigned By    Initials Name Provider Type    Charlotte Juan, NAYE Physical Therapy Assistant                      PT Assessment/Plan     Row Name 21 1022          PT Assessment    Assessment Comments  All goals have been met.  Patient did have some pain with defensive slides and braiding today.  -CP        PT Plan    PT Plan Comments  D/C to home with HEP  -CP       User Key  (r) = Recorded By, (t) = Taken By, (c) = Cosigned By    Initials Name Provider Type    Charlotte Juan, NAYE Physical Therapy Assistant               OP Exercises     Row Name 07/01/21 1024 07/01/21 0900          Subjective Comments    Subjective Comments  --  Patient states that he is 90% better.  He reportss no pain currently but states that he will have pain at the end of the day  -CP        Subjective Pain    Able to rate subjective pain?  --  yes  -CP     Pre-Treatment Pain Level  --  0  -CP     Post-Treatment Pain Level  --  0  -CP        Total Minutes    17531 - PT Therapeutic Exercise Minutes  40  -CP  --        Exercise 1    Exercise Name 1  --  Pro II level 4  -CP     Time 1  --  10  -CP        Exercise 2    Exercise Name 2  --  incline stretch  -CP     Cueing 2  --  Verbal  -CP     Sets 2  --  3  -CP     Time 2  --  30  -CP        Exercise 3    Exercise Name 3  --  soleus stretch  -CP     Cueing 3  --  Verbal  -CP     Sets 3  --  3  -CP     Time 3  --  30  -CP        Exercise 4    Exercise Name 4  --  step up BOSU  -CP     Cueing 4  --  Verbal;Demo  -CP     Sets 4  --  2  -CP     Reps 4  --  10  -CP        Exercise 5    Exercise Name 5  --  lat step up BOSU  -CP     Cueing 5  --  Verbal;Demo  -CP     Sets 5  --  2  -CP     Reps 5  --  10  -CP        Exercise 6    Exercise Name 6  --  up and down ramp 4 way  -CP     Cueing 6  --  Verbal;Demo  -CP     Reps 6  --  5  -CP     Time 6  --  each way  -CP        Exercise 7    Exercise Name 7  --  defensive slides  -CP     Cueing 7  --  Verbal;Demo  -CP     Reps 7  --  4  -CP     Time 7  --  30 ft  -CP        Exercise 8    Exercise Name 8  --  braiding  -CP     Cueing 8  --  Verbal;Demo  -CP     Reps 8  --  4  -CP     Time 8  --  30 ft  -CP        Exercise 9    Exercise Name 9  --  Canadian deadlift with 5LB DB  -CP     Cueing 9  --  Verbal;Demo  -CP     Sets 9  --  1  -CP     Reps 9  --  10  -CP       User Key  (r) = Recorded By, (t) = Taken By, (c) = Cosigned By    Initials Name Provider Type    Charlotte Juan, PTA Physical Therapy Assistant                         PT OP Goals     Row Name  07/01/21 0900          PT Short Term Goals    STG Date to Achieve  07/06/21  -CP     STG 1  Patient independent with HEP for the R foot/ankle  -CP     STG 1 Progress  Met  -CP     STG 2  Improve R ankle DF/PF to 2°/35°  -CP     STG 2 Progress  Met  -CP     STG 3  Improve R ankle MMT to 5/5  -CP     STG 3 Progress  Met  -CP     STG 4  Reduce R foot pain by 50% with standing and walking tasks  -CP     STG 4 Progress  Met  -CP     STG 5  Improve R ankle inv/aly AROM to 35° and 13°  -CP     STG 5 Progress  Met  -CP        Long Term Goals    LTG Date to Achieve  07/20/21  -CP     LTG 1  Improve R ankle DF/PF to 6°/40°  -CP     LTG 1 Progress  Met  -CP     LTG 2  Reduce R foot pain by 75% with standing and walking tasks  -CP     LTG 2 Progress  Met  -CP     LTG 3  Able to walk without walking boot with equal weight bearing (R=L) with non-antalgic gait  -CP     LTG 3 Progress  Met  -CP        Time Calculation    PT Goal Re-Cert Due Date  07/13/21  -CP       User Key  (r) = Recorded By, (t) = Taken By, (c) = Cosigned By    Initials Name Provider Type    CP Charlotte Bianchi PTA Physical Therapy Assistant                         Time Calculation:   Start Time: 0930  Stop Time: 1010  Time Calculation (min): 40 min  Total Timed Code Minutes- PT: 40 minute(s)  Timed Charges  55891 - PT Therapeutic Exercise Minutes: 40  Total Minutes  Timed Charges Total Minutes: 40   Total Minutes: 40  Therapy Charges for Today     Code Description Service Date Service Provider Modifiers Qty    [id number] HC PT THER PROC EA 15 MIN 7/1/2021 Charlotte Bianchi PTA GP 3                OP PT Discharge Summary  Date of Discharge: 07/01/21  Reason for Discharge: All goals achieved  Outcomes Achieved: Able to achieve all goals within established timeline  Discharge Destination: Home with home program      Charlotte Bianchi PTA  7/1/2021

## 2021-07-07 ENCOUNTER — APPOINTMENT (OUTPATIENT)
Dept: PHYSICAL THERAPY | Facility: HOSPITAL | Age: 36
End: 2021-07-07

## 2021-07-12 ENCOUNTER — OFFICE VISIT (OUTPATIENT)
Dept: PODIATRY | Facility: CLINIC | Age: 36
End: 2021-07-12

## 2021-07-12 VITALS
OXYGEN SATURATION: 98 % | DIASTOLIC BLOOD PRESSURE: 80 MMHG | WEIGHT: 152 LBS | HEART RATE: 75 BPM | HEIGHT: 70 IN | SYSTOLIC BLOOD PRESSURE: 118 MMHG | BODY MASS INDEX: 21.76 KG/M2

## 2021-07-12 DIAGNOSIS — S93.324D DISLOCATION OF TARSOMETATARSAL JOINT OF RIGHT FOOT, SUBSEQUENT ENCOUNTER: Primary | ICD-10-CM

## 2021-07-12 PROCEDURE — 99024 POSTOP FOLLOW-UP VISIT: CPT | Performed by: PODIATRIST

## 2021-07-12 NOTE — PROGRESS NOTES
Andres Wallace  1985  35 y.o. male      Patient came to clinic for a one month recheck of right foot.    07/12/2021     Chief Complaint   Patient presents with   • Right Foot - Follow-up       History of Present Illness    Andres Wallace is a 35 y.o.male who presents to clinic  for follow-up.  Patient has completed physical therapy and is ambulating pain-free unassisted in regular shoe gear.  States that he is ready to return to work.  Patient has history of open reduction internal fixation of Lisfranc fracture on April 15.    History reviewed. No pertinent past medical history.      Past Surgical History:   Procedure Laterality Date   • ORIF FOOT FRACTURE Right 4/15/2021    Procedure: OPEN REDUCTION AND INTERNAL FIXATION OF RIGHT LISFRANC FRACTURE DISLOCATION;  Surgeon: Golden Maki DPM;  Location: [facility];  Service: Podiatry;  Laterality: Right;   • ORIF WRIST FRACTURE Left    • OTHER SURGICAL HISTORY      gastric surgery secondary to perforated ulcer         Family History   Problem Relation Age of Onset   • Diabetes Mother    • Cancer Maternal Grandfather    • Cancer Paternal Grandfather    • Diabetes Paternal Grandfather        No Known Allergies    Social History     Socioeconomic History   • Marital status:      Spouse name: Not on file   • Number of children: Not on file   • Years of education: Not on file   • Highest education level: Not on file   Tobacco Use   • Smoking status: Current Every Day Smoker     Packs/day: 1.00     Years: 19.00     Pack years: 19.00   • Smokeless tobacco: Current User     Types: Snuff   Vaping Use   • Vaping Use: Never used   Substance and Sexual Activity   • Alcohol use: Yes     Comment: socially   • Drug use: Not Currently   • Sexual activity: Defer         Current Outpatient Medications   Medication Sig Dispense Refill   • HYDROcodone-acetaminophen (Norco)  MG per tablet Take 1 tablet by mouth Every 8 (Eight) Hours As Needed for Moderate  "Pain . 42 tablet 0   • HYDROcodone-acetaminophen (Norco) 7.5-325 MG per tablet Take 1 tablet by mouth Every 12 (Twelve) Hours As Needed for Moderate Pain . 28 tablet 0   • oxyCODONE-acetaminophen (Percocet) 7.5-325 MG per tablet Take 1 tablet by mouth Every 6 (Six) Hours As Needed for Moderate Pain . 28 tablet 0     No current facility-administered medications for this visit.       Review of Systems   Constitutional: Negative.    HENT: Negative.    Eyes: Negative.    Respiratory: Negative.    Cardiovascular: Negative.    Gastrointestinal: Negative.    Musculoskeletal:             Skin: Negative.    Psychiatric/Behavioral: Negative.          OBJECTIVE    /80   Pulse 75   Ht 177.8 cm (70\")   Wt 68.9 kg (152 lb)   SpO2 98%   BMI 21.81 kg/m²     Physical Exam  Vitals reviewed.   Constitutional:       General: He is not in acute distress.     Appearance: He is well-developed.   HENT:      Head: Normocephalic and atraumatic.      Nose: Nose normal.   Eyes:      Conjunctiva/sclera: Conjunctivae normal.      Pupils: Pupils are equal, round, and reactive to light.   Pulmonary:      Effort: Pulmonary effort is normal. No respiratory distress.      Breath sounds: No wheezing.   Musculoskeletal:         General: Swelling present.   Skin:     General: Skin is warm.      Capillary Refill: Capillary refill takes less than 2 seconds.   Neurological:      Mental Status: He is alert and oriented to person, place, and time.   Psychiatric:         Behavior: Behavior normal.         Thought Content: Thought content normal.          Right Lower Extremity Exam: Incision sites healed.  No edema right foot.  CFT immediate to distal digits.  Sensation intact to light touch.  Muscle strength is WNL.  Ankle joint range of motion is within normal limits.  First MTP range of motion is slightly decreased without pain.  TMT J range of motion slightly decreased without pain.      Foot/Ankle Exam        Procedures    ASSESSMENT AND " PLAN    Diagnoses and all orders for this visit:    1. Dislocation of tarsometatarsal joint of right foot, subsequent encounter (Primary)  -     XR Foot 3+ View Right          -Patient doing well postoperatively.  Progress activity as tolerated.  Okay to return to work.  Recheck 3 months at which time patient will likely be scheduled for hardware removal right foot.          This document has been electronically signed by Golden Maki DPM on July 12, 2021 09:10 CDT     7/12/2021  09:10 CDT

## 2021-09-20 ENCOUNTER — OFFICE VISIT (OUTPATIENT)
Dept: PODIATRY | Facility: CLINIC | Age: 36
End: 2021-09-20

## 2021-09-20 VITALS — HEART RATE: 76 BPM | OXYGEN SATURATION: 98 % | BODY MASS INDEX: 21.76 KG/M2 | WEIGHT: 152 LBS | HEIGHT: 70 IN

## 2021-09-20 DIAGNOSIS — S93.324D DISLOCATION OF TARSOMETATARSAL JOINT OF RIGHT FOOT, SUBSEQUENT ENCOUNTER: Primary | ICD-10-CM

## 2021-09-20 DIAGNOSIS — T85.848A PAIN FROM IMPLANTED HARDWARE, INITIAL ENCOUNTER: ICD-10-CM

## 2021-09-20 PROCEDURE — 99214 OFFICE O/P EST MOD 30 MIN: CPT | Performed by: PODIATRIST

## 2021-09-20 RX ORDER — BUPIVACAINE HCL/0.9 % NACL/PF 0.1 %
2 PLASTIC BAG, INJECTION (ML) EPIDURAL ONCE
Status: CANCELLED | OUTPATIENT
Start: 2021-09-30 | End: 2021-09-20

## 2021-09-20 NOTE — PROGRESS NOTES
"Andres Wallace  1985  35 y.o. male     09/20/2021     Chief Complaint   Patient presents with   • Right Foot - Follow-up       History of Present Illness    Andres Wallace is a 35 y.o.male who presents to clinic  for follow-up and to schedule hardware removal.    History reviewed. No pertinent past medical history.      Past Surgical History:   Procedure Laterality Date   • ORIF FOOT FRACTURE Right 4/15/2021    Procedure: OPEN REDUCTION AND INTERNAL FIXATION OF RIGHT LISFRANC FRACTURE DISLOCATION;  Surgeon: Golden Maki DPM;  Location: [facility];  Service: Podiatry;  Laterality: Right;   • ORIF WRIST FRACTURE Left    • OTHER SURGICAL HISTORY      gastric surgery secondary to perforated ulcer         Family History   Problem Relation Age of Onset   • Diabetes Mother    • Cancer Maternal Grandfather    • Cancer Paternal Grandfather    • Diabetes Paternal Grandfather        No Known Allergies    Social History     Socioeconomic History   • Marital status:      Spouse name: Not on file   • Number of children: Not on file   • Years of education: Not on file   • Highest education level: Not on file   Tobacco Use   • Smoking status: Current Every Day Smoker     Packs/day: 1.00     Years: 19.00     Pack years: 19.00   • Smokeless tobacco: Current User     Types: Snuff   Vaping Use   • Vaping Use: Never used   Substance and Sexual Activity   • Alcohol use: Yes     Comment: socially   • Drug use: Not Currently   • Sexual activity: Defer         No current outpatient medications on file.     No current facility-administered medications for this visit.       Review of Systems   Constitutional: Negative.    HENT: Negative.    Eyes: Negative.    Respiratory: Negative.    Cardiovascular: Negative.    Gastrointestinal: Negative.    Musculoskeletal: Negative.    Skin: Negative.    Psychiatric/Behavioral: Negative.          OBJECTIVE    Pulse 76   Ht 177.8 cm (70\")   Wt 68.9 kg (152 lb)   SpO2 98%   BMI " 21.81 kg/m²     Physical Exam  Vitals reviewed.   Constitutional:       General: He is not in acute distress.     Appearance: He is well-developed.   HENT:      Head: Normocephalic and atraumatic.      Nose: Nose normal.   Eyes:      Conjunctiva/sclera: Conjunctivae normal.      Pupils: Pupils are equal, round, and reactive to light.   Pulmonary:      Effort: Pulmonary effort is normal. No respiratory distress.      Breath sounds: No wheezing.   Skin:     General: Skin is warm.      Capillary Refill: Capillary refill takes less than 2 seconds.   Neurological:      Mental Status: He is alert and oriented to person, place, and time.   Psychiatric:         Behavior: Behavior normal.         Thought Content: Thought content normal.          Right Lower Extremity Exam: Incision sites healed.  No edema right foot.  CFT immediate to distal digits.  Sensation intact to light touch.  Muscle strength is WNL.  Ankle joint range of motion is within normal limits.  First MTP range of motion is slightly decreased without pain.  TMT J range of motion slightly decreased without pain.      Foot/Ankle Exam        Procedures    ASSESSMENT AND PLAN    Diagnoses and all orders for this visit:    1. Dislocation of tarsometatarsal joint of right foot, subsequent encounter (Primary)  -     XR Foot 3+ View Right  -     XR Foot 3+ View Right    2. Pain from implanted hardware, initial encounter  -     Case Request; Standing  -     ceFAZolin (ANCEF) 2 g in sodium chloride 0.9 % 100 mL IVPB  -     Case Request    Other orders  -     Follow Anesthesia Guidelines / Standing Orders; Standing  -     Verify NPO Status; Standing  -     Obtain informed consent (if not collected inpatient or PAT); Standing  -     Notify Physician - Standard; Standing          -Patient doing well postoperatively.  Repeat radiographs taken reviewed.  I discussed hardware removal which patient agreed to.  -Surgical plan is hardware removal right foot  -Risks and benefits  of the procedure including but not limited to postoperative infection, incisional dehiscence, numbness, swelling, residual pain and postoperative blood clot discussed in detail.  No guarantees were given or implied.  -Tentative date for the surgery September 30, 2021  -All questions were answered  -Recheck following surgery           This document has been electronically signed by Golden Maki DPM on September 20, 2021 12:31 CDT     9/20/2021  12:31 CDT

## 2021-09-28 ENCOUNTER — PRE-ADMISSION TESTING (OUTPATIENT)
Dept: PREADMISSION TESTING | Facility: HOSPITAL | Age: 36
End: 2021-09-28

## 2021-09-28 ENCOUNTER — LAB (OUTPATIENT)
Dept: LAB | Facility: HOSPITAL | Age: 36
End: 2021-09-28

## 2021-09-28 VITALS
HEART RATE: 82 BPM | DIASTOLIC BLOOD PRESSURE: 70 MMHG | RESPIRATION RATE: 18 BRPM | WEIGHT: 160 LBS | SYSTOLIC BLOOD PRESSURE: 108 MMHG | HEIGHT: 70 IN | BODY MASS INDEX: 22.9 KG/M2 | OXYGEN SATURATION: 98 %

## 2021-09-28 DIAGNOSIS — Z01.818 PREOP TESTING: Primary | ICD-10-CM

## 2021-09-28 LAB — SARS-COV-2 N GENE RESP QL NAA+PROBE: NOT DETECTED

## 2021-09-28 PROCEDURE — 87635 SARS-COV-2 COVID-19 AMP PRB: CPT

## 2021-09-28 PROCEDURE — C9803 HOPD COVID-19 SPEC COLLECT: HCPCS

## 2021-09-28 RX ORDER — SODIUM CHLORIDE, SODIUM GLUCONATE, SODIUM ACETATE, POTASSIUM CHLORIDE AND MAGNESIUM CHLORIDE 526; 502; 368; 37; 30 MG/100ML; MG/100ML; MG/100ML; MG/100ML; MG/100ML
1000 INJECTION, SOLUTION INTRAVENOUS CONTINUOUS PRN
Status: CANCELLED | OUTPATIENT
Start: 2021-09-30

## 2021-09-29 ENCOUNTER — ANESTHESIA EVENT (OUTPATIENT)
Dept: PERIOP | Facility: HOSPITAL | Age: 36
End: 2021-09-29

## 2021-09-30 ENCOUNTER — ANESTHESIA (OUTPATIENT)
Dept: PERIOP | Facility: HOSPITAL | Age: 36
End: 2021-09-30

## 2021-09-30 ENCOUNTER — APPOINTMENT (OUTPATIENT)
Dept: GENERAL RADIOLOGY | Facility: HOSPITAL | Age: 36
End: 2021-09-30

## 2021-09-30 ENCOUNTER — HOSPITAL ENCOUNTER (OUTPATIENT)
Facility: HOSPITAL | Age: 36
Setting detail: HOSPITAL OUTPATIENT SURGERY
Discharge: HOME | End: 2021-09-30
Attending: PODIATRIST | Admitting: PODIATRIST

## 2021-09-30 VITALS
RESPIRATION RATE: 18 BRPM | SYSTOLIC BLOOD PRESSURE: 124 MMHG | DIASTOLIC BLOOD PRESSURE: 70 MMHG | HEIGHT: 71 IN | BODY MASS INDEX: 22.28 KG/M2 | TEMPERATURE: 97.1 F | WEIGHT: 159.17 LBS | OXYGEN SATURATION: 100 % | HEART RATE: 74 BPM

## 2021-09-30 DIAGNOSIS — T85.848D PAIN FROM IMPLANTED HARDWARE, SUBSEQUENT ENCOUNTER: Primary | ICD-10-CM

## 2021-09-30 DIAGNOSIS — T85.848A PAIN FROM IMPLANTED HARDWARE, INITIAL ENCOUNTER: ICD-10-CM

## 2021-09-30 LAB
AMPHET+METHAMPHET UR QL: NEGATIVE
AMPHETAMINES UR QL: NEGATIVE
BARBITURATES UR QL SCN: NEGATIVE
BENZODIAZ UR QL SCN: NEGATIVE
BUPRENORPHINE SERPL-MCNC: NEGATIVE NG/ML
CANNABINOIDS SERPL QL: POSITIVE
COCAINE UR QL: NEGATIVE
METHADONE UR QL SCN: NEGATIVE
OPIATES UR QL: NEGATIVE
OXYCODONE UR QL SCN: NEGATIVE
PCP UR QL SCN: NEGATIVE
PROPOXYPH UR QL: NEGATIVE
TRICYCLICS UR QL SCN: NEGATIVE

## 2021-09-30 PROCEDURE — 80306 DRUG TEST PRSMV INSTRMNT: CPT | Performed by: ANESTHESIOLOGY

## 2021-09-30 PROCEDURE — 25010000002 CEFAZOLIN PER 500 MG: Performed by: PODIATRIST

## 2021-09-30 PROCEDURE — 25010000002 PROPOFOL 10 MG/ML EMULSION: Performed by: NURSE ANESTHETIST, CERTIFIED REGISTERED

## 2021-09-30 PROCEDURE — 25010000002 MIDAZOLAM PER 1 MG: Performed by: NURSE ANESTHETIST, CERTIFIED REGISTERED

## 2021-09-30 PROCEDURE — 20680 REMOVAL OF IMPLANT DEEP: CPT | Performed by: PODIATRIST

## 2021-09-30 PROCEDURE — 76000 FLUOROSCOPY <1 HR PHYS/QHP: CPT

## 2021-09-30 PROCEDURE — 25010000002 FENTANYL CITRATE (PF) 50 MCG/ML SOLUTION: Performed by: NURSE ANESTHETIST, CERTIFIED REGISTERED

## 2021-09-30 RX ORDER — MEPERIDINE HYDROCHLORIDE 25 MG/ML
12.5 INJECTION INTRAMUSCULAR; INTRAVENOUS; SUBCUTANEOUS
Status: DISCONTINUED | OUTPATIENT
Start: 2021-09-30 | End: 2021-09-30 | Stop reason: HOSPADM

## 2021-09-30 RX ORDER — PROPOFOL 10 MG/ML
VIAL (ML) INTRAVENOUS AS NEEDED
Status: DISCONTINUED | OUTPATIENT
Start: 2021-09-30 | End: 2021-09-30 | Stop reason: SURG

## 2021-09-30 RX ORDER — EPHEDRINE SULFATE 50 MG/ML
5 INJECTION, SOLUTION INTRAVENOUS ONCE AS NEEDED
Status: DISCONTINUED | OUTPATIENT
Start: 2021-09-30 | End: 2021-09-30 | Stop reason: HOSPADM

## 2021-09-30 RX ORDER — FLUMAZENIL 0.1 MG/ML
0.2 INJECTION INTRAVENOUS AS NEEDED
Status: DISCONTINUED | OUTPATIENT
Start: 2021-09-30 | End: 2021-09-30 | Stop reason: HOSPADM

## 2021-09-30 RX ORDER — FENTANYL CITRATE 50 UG/ML
INJECTION, SOLUTION INTRAMUSCULAR; INTRAVENOUS AS NEEDED
Status: DISCONTINUED | OUTPATIENT
Start: 2021-09-30 | End: 2021-09-30 | Stop reason: SURG

## 2021-09-30 RX ORDER — MIDAZOLAM HYDROCHLORIDE 1 MG/ML
INJECTION INTRAMUSCULAR; INTRAVENOUS AS NEEDED
Status: DISCONTINUED | OUTPATIENT
Start: 2021-09-30 | End: 2021-09-30 | Stop reason: SURG

## 2021-09-30 RX ORDER — PROMETHAZINE HYDROCHLORIDE 25 MG/1
25 SUPPOSITORY RECTAL ONCE AS NEEDED
Status: DISCONTINUED | OUTPATIENT
Start: 2021-09-30 | End: 2021-09-30 | Stop reason: HOSPADM

## 2021-09-30 RX ORDER — NALOXONE HCL 0.4 MG/ML
0.4 VIAL (ML) INJECTION AS NEEDED
Status: DISCONTINUED | OUTPATIENT
Start: 2021-09-30 | End: 2021-09-30 | Stop reason: HOSPADM

## 2021-09-30 RX ORDER — BUPIVACAINE HYDROCHLORIDE 2.5 MG/ML
INJECTION, SOLUTION EPIDURAL; INFILTRATION; INTRACAUDAL AS NEEDED
Status: DISCONTINUED | OUTPATIENT
Start: 2021-09-30 | End: 2021-09-30 | Stop reason: HOSPADM

## 2021-09-30 RX ORDER — ONDANSETRON 2 MG/ML
4 INJECTION INTRAMUSCULAR; INTRAVENOUS ONCE AS NEEDED
Status: DISCONTINUED | OUTPATIENT
Start: 2021-09-30 | End: 2021-09-30 | Stop reason: HOSPADM

## 2021-09-30 RX ORDER — HYDROCODONE BITARTRATE AND ACETAMINOPHEN 7.5; 325 MG/1; MG/1
1 TABLET ORAL EVERY 4 HOURS PRN
Qty: 30 TABLET | Refills: 0 | Status: SHIPPED | OUTPATIENT
Start: 2021-09-30 | End: 2021-11-29

## 2021-09-30 RX ORDER — ACETAMINOPHEN 325 MG/1
650 TABLET ORAL ONCE AS NEEDED
Status: DISCONTINUED | OUTPATIENT
Start: 2021-09-30 | End: 2021-09-30 | Stop reason: HOSPADM

## 2021-09-30 RX ORDER — LIDOCAINE HYDROCHLORIDE 20 MG/ML
INJECTION, SOLUTION INFILTRATION; PERINEURAL AS NEEDED
Status: DISCONTINUED | OUTPATIENT
Start: 2021-09-30 | End: 2021-09-30 | Stop reason: SURG

## 2021-09-30 RX ORDER — PROMETHAZINE HYDROCHLORIDE 25 MG/1
25 TABLET ORAL ONCE AS NEEDED
Status: DISCONTINUED | OUTPATIENT
Start: 2021-09-30 | End: 2021-09-30 | Stop reason: HOSPADM

## 2021-09-30 RX ORDER — DIPHENHYDRAMINE HYDROCHLORIDE 50 MG/ML
12.5 INJECTION INTRAMUSCULAR; INTRAVENOUS
Status: DISCONTINUED | OUTPATIENT
Start: 2021-09-30 | End: 2021-09-30 | Stop reason: HOSPADM

## 2021-09-30 RX ORDER — BUPIVACAINE HCL/0.9 % NACL/PF 0.1 %
2 PLASTIC BAG, INJECTION (ML) EPIDURAL ONCE
Status: COMPLETED | OUTPATIENT
Start: 2021-09-30 | End: 2021-09-30

## 2021-09-30 RX ORDER — ACETAMINOPHEN 650 MG/1
650 SUPPOSITORY RECTAL ONCE AS NEEDED
Status: DISCONTINUED | OUTPATIENT
Start: 2021-09-30 | End: 2021-09-30 | Stop reason: HOSPADM

## 2021-09-30 RX ORDER — SODIUM CHLORIDE, SODIUM GLUCONATE, SODIUM ACETATE, POTASSIUM CHLORIDE AND MAGNESIUM CHLORIDE 526; 502; 368; 37; 30 MG/100ML; MG/100ML; MG/100ML; MG/100ML; MG/100ML
1000 INJECTION, SOLUTION INTRAVENOUS CONTINUOUS PRN
Status: DISCONTINUED | OUTPATIENT
Start: 2021-09-30 | End: 2021-09-30 | Stop reason: HOSPADM

## 2021-09-30 RX ADMIN — LIDOCAINE HYDROCHLORIDE 50 MG: 20 INJECTION, SOLUTION INFILTRATION; PERINEURAL at 09:55

## 2021-09-30 RX ADMIN — PROPOFOL 100 MG: 10 INJECTION, EMULSION INTRAVENOUS at 09:55

## 2021-09-30 RX ADMIN — FENTANYL CITRATE 50 MCG: 50 INJECTION INTRAMUSCULAR; INTRAVENOUS at 10:29

## 2021-09-30 RX ADMIN — MIDAZOLAM HYDROCHLORIDE 2 MG: 1 INJECTION, SOLUTION INTRAMUSCULAR; INTRAVENOUS at 09:49

## 2021-09-30 RX ADMIN — SODIUM CHLORIDE, SODIUM GLUCONATE, SODIUM ACETATE, POTASSIUM CHLORIDE AND MAGNESIUM CHLORIDE 1000 ML: 526; 502; 368; 37; 30 INJECTION, SOLUTION INTRAVENOUS at 07:44

## 2021-09-30 RX ADMIN — Medication 2 G: at 09:54

## 2021-09-30 RX ADMIN — FENTANYL CITRATE 25 MCG: 50 INJECTION INTRAMUSCULAR; INTRAVENOUS at 10:24

## 2021-09-30 RX ADMIN — FENTANYL CITRATE 25 MCG: 50 INJECTION INTRAMUSCULAR; INTRAVENOUS at 10:11

## 2021-09-30 RX ADMIN — FENTANYL CITRATE 50 MCG: 50 INJECTION INTRAMUSCULAR; INTRAVENOUS at 09:55

## 2021-09-30 NOTE — ANESTHESIA POSTPROCEDURE EVALUATION
Patient: Andres Wallace    Procedure Summary     Date: 09/30/21 Room / Location: [facility] OR  / [facility] OR    Anesthesia Start: 0949 Anesthesia Stop: 1119    Procedure: HARDWARE REMOVALRIGHT FOOT (Right ) Diagnosis:       Pain from implanted hardware, initial encounter      (Pain from implanted hardware, initial encounter [T85.848T])    Surgeons: Golden Maki DPM Provider: Moses Staley MD    Anesthesia Type: general ASA Status: 3          Anesthesia Type: general    Vitals  Vitals Value Taken Time   /56 09/30/21 1115   Temp 97.7 °F (36.5 °C) 09/30/21 1115   Pulse 69 09/30/21 1115   Resp 12 09/30/21 1115   SpO2 96 % 09/30/21 1115           Post Anesthesia Care and Evaluation    Patient location during evaluation: PACU  Patient participation: complete - patient participated  Level of consciousness: awake and alert  Pain score: 0  Pain management: adequate  Airway patency: patent  Anesthetic complications: No anesthetic complications  PONV Status: none  Cardiovascular status: acceptable  Respiratory status: acceptable  Hydration status: acceptable    Comments: ---------------------------               09/30/21                      1115         ---------------------------   BP:          106/56         Pulse:         69           Resp:          12           Temp:   97.7 °F (36.5 °C)   SpO2:          96%         ---------------------------

## 2021-09-30 NOTE — ANESTHESIA PREPROCEDURE EVALUATION
Anesthesia Evaluation     Patient summary reviewed and Nursing notes reviewed   no history of anesthetic complications:  NPO Solid Status: > 8 hours  NPO Liquid Status: > 8 hours           Airway   Mallampati: II  TM distance: >3 FB  Neck ROM: full  possible difficult intubation  Comment: Full trimmed beard.Patient location during procedure: OR  CRNA: Viral Jacobo CRNA     Indications and Patient Condition  Indications for airway management: airway protection    Preoxygenated: yes  Mask difficulty assessment: 1 - vent by mask     Final Airway Details  Final airway type: supraglottic airway        Successful airway: I-gel  Size 4    Cormack-Lehane Classification: grade IIa - partial view of glottis  Number of attempts at approach: 1  Assessment: lips, teeth, and gum same as pre-op     Additional Comments  Teeth checked after intubation.  No damage noted.  Dental    (+) poor dentation    Comment: Right upper central incisor crown.    Pulmonary     breath sounds clear to auscultation  (+) a smoker (1 ppd) Current Smoked day of surgery,   (-) COPD, asthma, sleep apnea  Cardiovascular - negative cardio ROS and normal exam  Exercise tolerance: good (4-7 METS)    Rhythm: regular  Rate: normal    (-) hypertension, valvular problems/murmurs, angina, murmur, DVT, hyperlipidemia      Neuro/Psych  (+) numbness (some tingling in the right foot),     (-) seizures, TIA, CVA, headaches, psychiatric history  GI/Hepatic/Renal/Endo    (-) GERD, hepatitis, liver disease, no renal disease, diabetes    Musculoskeletal     (+) radiculopathy (sciatica on right) Right lower extremity      ROS comment: Work related injury to his right foot.  Abdominal    Substance History   (+) alcohol use (socially),   (-) drug use     OB/GYN negative ob/gyn ROS         Other   arthritis (wrist),      (-) history of cancer  ROS/Med Hx Other: Pain 6/10                    Anesthesia Plan    ASA 3     general     intravenous induction     Anesthetic plan,  all risks, benefits, and alternatives have been provided, discussed and informed consent has been obtained with: patient.

## 2021-09-30 NOTE — ANESTHESIA PROCEDURE NOTES
Airway  Urgency: elective    Date/Time: 9/30/2021 9:56 AM  Airway not difficult    General Information and Staff    Patient location during procedure: OR  CRNA: Jesus Manuel Smith CRNA    Indications and Patient Condition  Indications for airway management: airway protection    Preoxygenated: yes  Mask difficulty assessment: 0 - not attempted    Final Airway Details  Final airway type: supraglottic airway      Successful airway: I-gel  Size 4    Number of attempts at approach: 1  Assessment: lips, teeth, and gum same as pre-op and atraumatic intubation

## 2021-10-04 ENCOUNTER — OFFICE VISIT (OUTPATIENT)
Dept: PODIATRY | Facility: CLINIC | Age: 36
End: 2021-10-04

## 2021-10-04 VITALS
DIASTOLIC BLOOD PRESSURE: 74 MMHG | HEIGHT: 71 IN | SYSTOLIC BLOOD PRESSURE: 130 MMHG | OXYGEN SATURATION: 98 % | WEIGHT: 159 LBS | HEART RATE: 97 BPM | BODY MASS INDEX: 22.26 KG/M2

## 2021-10-04 DIAGNOSIS — T85.848A PAIN FROM IMPLANTED HARDWARE, INITIAL ENCOUNTER: Primary | ICD-10-CM

## 2021-10-04 LAB
LAB AP CASE REPORT: NORMAL
PATH REPORT.FINAL DX SPEC: NORMAL

## 2021-10-04 PROCEDURE — 99024 POSTOP FOLLOW-UP VISIT: CPT | Performed by: PODIATRIST

## 2021-10-04 NOTE — PROGRESS NOTES
Andres Wallace  1985  35 y.o. male     10/04/2021     Chief Complaint   Patient presents with   • Right Foot - Follow-up, Post-op       History of Present Illness    Andres Wallace is a 35 y.o.male who presents for his first postoperative visit.  Patient had hardware removal on September 30.    Past Medical History:   Diagnosis Date   • Foot fracture, right          Past Surgical History:   Procedure Laterality Date   • HARDWARE REMOVAL Right 9/30/2021    Procedure: HARDWARE REMOVALRIGHT FOOT;  Surgeon: Golden Maki DPM;  Location: [facility];  Service: Podiatry;  Laterality: Right;   • ORIF FOOT FRACTURE Right 4/15/2021    Procedure: OPEN REDUCTION AND INTERNAL FIXATION OF RIGHT LISFRANC FRACTURE DISLOCATION;  Surgeon: Golden Maki DPM;  Location: [facility];  Service: Podiatry;  Laterality: Right;   • ORIF WRIST FRACTURE Left    • OTHER SURGICAL HISTORY      gastric surgery secondary to perforated ulcer         Family History   Problem Relation Age of Onset   • Diabetes Mother    • Cancer Maternal Grandfather    • Cancer Paternal Grandfather    • Diabetes Paternal Grandfather        No Known Allergies    Social History     Socioeconomic History   • Marital status:      Spouse name: Not on file   • Number of children: Not on file   • Years of education: Not on file   • Highest education level: Not on file   Tobacco Use   • Smoking status: Current Every Day Smoker     Packs/day: 1.00     Years: 19.00     Pack years: 19.00   • Smokeless tobacco: Current User     Types: Snuff   Vaping Use   • Vaping Use: Never used   Substance and Sexual Activity   • Alcohol use: Yes     Comment: socially   • Sexual activity: Yes     Partners: Female     Birth control/protection: None         Current Outpatient Medications   Medication Sig Dispense Refill   • HYDROcodone-acetaminophen (Norco) 7.5-325 MG per tablet Take 1 tablet by mouth Every 4 (Four) Hours As Needed for Moderate Pain. 30 tablet 0     No  "current facility-administered medications for this visit.       Review of Systems   Constitutional: Negative.    HENT: Negative.    Eyes: Negative.    Respiratory: Negative.    Cardiovascular: Negative.    Gastrointestinal: Negative.    Musculoskeletal:        Right foot pain    Psychiatric/Behavioral: Negative.          OBJECTIVE    /74   Pulse 97   Ht 180.3 cm (71\")   Wt 72.1 kg (159 lb)   SpO2 98%   BMI 22.18 kg/m²      Physical Exam  Vitals reviewed.   Constitutional:       General: He is not in acute distress.     Appearance: He is well-developed.   HENT:      Head: Normocephalic and atraumatic.      Nose: Nose normal.   Eyes:      Conjunctiva/sclera: Conjunctivae normal.      Pupils: Pupils are equal, round, and reactive to light.   Pulmonary:      Effort: Pulmonary effort is normal. No respiratory distress.      Breath sounds: No wheezing.   Skin:     General: Skin is warm.      Capillary Refill: Capillary refill takes less than 2 seconds.   Neurological:      Mental Status: He is alert and oriented to person, place, and time.   Psychiatric:         Behavior: Behavior normal.         Thought Content: Thought content normal.          Right Lower Extremity Exam: Incision sites are well approximated.  Mild edema and ecchymosis.  Negative Homans.  Sensation intact to light touch.      Foot/Ankle Exam        Procedures    ASSESSMENT AND PLAN    Diagnoses and all orders for this visit:    1. Pain from implanted hardware, initial encounter (Primary)          -Patient doing well postoperatively.  Dressing change performed.  Keep clean, dry and intact.  Weight-bear as tolerated in cam boot.  Recheck 1 week.          This document has been electronically signed by Golden Maki DPM on October 6, 2021 19:14 CDT     10/6/2021  19:14 CDT    "

## 2021-10-05 NOTE — OP NOTE
Date of Procedure: 09/20/2021     SURGEON: Golden Maki DPM      Assistant: Loli Stout CST     PREOPERATIVE DIAGNOSIS: Painful retained hardware right foot     POSTOPERATIVE DIAGNOSIS: Same as preop     PROCEDURES PERFORMED: Hardware removal right foot     ANESTHESIA: General     HEMOSTASIS: Pneumatic ankle tourniquet set at 250 mmHg.      ESTIMATED BLOOD LOSS: 15 mL.     SPECIMEN: Metallic plate, metallic screws x7     MATERIALS: None     INJECTABLES: 18 cc of half percent Marcaine plain postoperatively     COMPLICATIONS: None.      CONDITION: Stable.      INDICATIONS FOR PROCEDURE: This is a patient of mine who has history of Lisfranc open reduction internal fixation.  He is having hardware pain and presents for removal.  He agrees to undergo the surgical intervention at this time. Consent is signed and documented in the chart. History and physical exam were reviewed prior to patient being taken to the operating room and n.p.o. status was confirmed. No guarantees were given or implied regarding the outcome of this treatment.      DESCRIPTION OF PROCEDURE: After mild sedation was administered by the anesthesia team in the preoperative holding area the patient was transported to the operating room and placed in a supine position.  General anesthesia was then administered and the right foot was prepped and draped in usual sterile technique and a timeout was performed to confirm the correct patient, correct site, and correct procedure.      Attention was then directed to the right foot which was exsanguinated using an Esmarch bandage and tourniquet wrapped inflated to 250 mmHg.  Next a linear incision was made dorsally over the first tarsometatarsal joint.  Sharp dissection carried down to the level of hardware and bone.  Dorsal metallic plate noted.  Screws were removed from the plate distally.  Attempted removal of screws from the proximal plate resulted in heads of both screws breaking.  Plate was  removed.  Broken screws were removed.  Next screws noted to the base of the first metatarsal was identified and removed intact in hole.  Then 2 screws noted originating from the medial cuneiform and one going into the second metatarsal base and the other going into the intermediate cuneiform were identified and removed intact and in hole.  Intraoperative fluoroscopy was used throughout the entire process to ensure that all hardware was removed intact in hole.  The operative site was flushed and closed with 3-0 Vicryl and 4-0 nylon.  A sterile dressing was applied.  The tourniquet was deflated with a rapid hyperemic response noted to the distal digit.  Patient tolerated the procedure and anesthesia well and was transported from the operating room to the PACU with vital signs stable and neurovascular status intact to the operative extremity.    Plan to discharge patient home once stable per anesthesia.  He can resume his normal diet.  He can be weightbearing as tolerated in cam boot.  He will follow-up with podiatry clinic within 1 week.            This document has been electronically signed by Golden Maki DPM on October 5, 2021 16:26 CDT

## 2021-10-13 ENCOUNTER — OFFICE VISIT (OUTPATIENT)
Dept: PODIATRY | Facility: CLINIC | Age: 36
End: 2021-10-13

## 2021-10-13 VITALS
OXYGEN SATURATION: 99 % | HEIGHT: 71 IN | WEIGHT: 159 LBS | BODY MASS INDEX: 22.26 KG/M2 | SYSTOLIC BLOOD PRESSURE: 122 MMHG | DIASTOLIC BLOOD PRESSURE: 80 MMHG | HEART RATE: 83 BPM

## 2021-10-13 DIAGNOSIS — T85.848A PAIN FROM IMPLANTED HARDWARE, INITIAL ENCOUNTER: Primary | ICD-10-CM

## 2021-10-13 PROCEDURE — 99024 POSTOP FOLLOW-UP VISIT: CPT | Performed by: PODIATRIST

## 2021-10-13 NOTE — PROGRESS NOTES
Andres Wallace  1985  35 y.o. male     Post op of right foot hardware removal     10/13/2021     Chief Complaint   Patient presents with   • Right Foot - Post-op       History of Present Illness    Andres Wallace is a 35 y.o.male who presents for his second postoperative visit.  Patient had hardware removal on September 30.    Past Medical History:   Diagnosis Date   • Foot fracture, right          Past Surgical History:   Procedure Laterality Date   • HARDWARE REMOVAL Right 9/30/2021    Procedure: HARDWARE REMOVALRIGHT FOOT;  Surgeon: Golden Maki DPM;  Location: [facility];  Service: Podiatry;  Laterality: Right;   • ORIF FOOT FRACTURE Right 4/15/2021    Procedure: OPEN REDUCTION AND INTERNAL FIXATION OF RIGHT LISFRANC FRACTURE DISLOCATION;  Surgeon: Golden Maki DPM;  Location: [facility];  Service: Podiatry;  Laterality: Right;   • ORIF WRIST FRACTURE Left    • OTHER SURGICAL HISTORY      gastric surgery secondary to perforated ulcer         Family History   Problem Relation Age of Onset   • Diabetes Mother    • Cancer Maternal Grandfather    • Cancer Paternal Grandfather    • Diabetes Paternal Grandfather        No Known Allergies    Social History     Socioeconomic History   • Marital status:    Tobacco Use   • Smoking status: Current Every Day Smoker     Packs/day: 1.00     Years: 19.00     Pack years: 19.00   • Smokeless tobacco: Current User     Types: Snuff   Vaping Use   • Vaping Use: Never used   Substance and Sexual Activity   • Alcohol use: Yes     Comment: socially   • Sexual activity: Yes     Partners: Female     Birth control/protection: None         Current Outpatient Medications   Medication Sig Dispense Refill   • HYDROcodone-acetaminophen (Norco) 7.5-325 MG per tablet Take 1 tablet by mouth Every 4 (Four) Hours As Needed for Moderate Pain. 30 tablet 0     No current facility-administered medications for this visit.       Review of Systems   Constitutional: Negative.   "  HENT: Negative.    Eyes: Negative.    Respiratory: Negative.    Cardiovascular: Negative.    Gastrointestinal: Negative.    Musculoskeletal:        Right foot pain    Psychiatric/Behavioral: Negative.          OBJECTIVE    /80   Pulse 83   Ht 180.3 cm (71\")   Wt 72.1 kg (159 lb)   SpO2 99%   BMI 22.18 kg/m²      Physical Exam  Vitals reviewed.   Constitutional:       General: He is not in acute distress.     Appearance: He is well-developed.   HENT:      Head: Normocephalic and atraumatic.      Nose: Nose normal.   Eyes:      Conjunctiva/sclera: Conjunctivae normal.      Pupils: Pupils are equal, round, and reactive to light.   Pulmonary:      Effort: Pulmonary effort is normal. No respiratory distress.      Breath sounds: No wheezing.   Skin:     General: Skin is warm.      Capillary Refill: Capillary refill takes less than 2 seconds.   Neurological:      Mental Status: He is alert and oriented to person, place, and time.   Psychiatric:         Behavior: Behavior normal.         Thought Content: Thought content normal.          Right Lower Extremity Exam: Incision sites are well approximated.  Minimal edema.  Negative Homans.  Sensation intact to light touch.      Foot/Ankle Exam        Procedures    ASSESSMENT AND PLAN    Diagnoses and all orders for this visit:    1. Pain from implanted hardware, initial encounter (Primary)          -Patient doing well postoperatively.   sutures removed.  Okay to shower.  Transition to athletic shoe gear as tolerated.  Recommended compression stocking.  Return to work.  Recheck 2 weeks          This document has been electronically signed by Golden Maki DPM on October 13, 2021 09:50 CDT     10/13/2021  09:50 CDT    "

## 2021-10-27 ENCOUNTER — OFFICE VISIT (OUTPATIENT)
Dept: PODIATRY | Facility: CLINIC | Age: 36
End: 2021-10-27

## 2021-10-27 VITALS
BODY MASS INDEX: 22.26 KG/M2 | SYSTOLIC BLOOD PRESSURE: 122 MMHG | OXYGEN SATURATION: 98 % | DIASTOLIC BLOOD PRESSURE: 70 MMHG | WEIGHT: 159 LBS | HEIGHT: 71 IN | HEART RATE: 116 BPM

## 2021-10-27 DIAGNOSIS — T85.848A PAIN FROM IMPLANTED HARDWARE, INITIAL ENCOUNTER: Primary | ICD-10-CM

## 2021-10-27 PROCEDURE — 99024 POSTOP FOLLOW-UP VISIT: CPT | Performed by: PODIATRIST

## 2021-10-27 RX ORDER — MELOXICAM 15 MG/1
15 TABLET ORAL DAILY
Qty: 30 TABLET | Refills: 3 | Status: SHIPPED | OUTPATIENT
Start: 2021-10-27 | End: 2021-11-29

## 2021-10-27 NOTE — PROGRESS NOTES
Andres Wallace  1985  35 y.o. male     Post op of right foot hardware removal.    10/27/2021     Chief Complaint   Patient presents with   • Right Foot - Follow-up, Post-op       History of Present Illness    Andres Wallace is a 35 y.o.male who presents for his third postoperative visit.  Patient had hardware removal on September 30.  Overall he is doing very well.  Does relate to soreness with prolonged weightbearing.    Past Medical History:   Diagnosis Date   • Foot fracture, right          Past Surgical History:   Procedure Laterality Date   • HARDWARE REMOVAL Right 9/30/2021    Procedure: HARDWARE REMOVALRIGHT FOOT;  Surgeon: Golden Maki DPM;  Location: [facility];  Service: Podiatry;  Laterality: Right;   • ORIF FOOT FRACTURE Right 4/15/2021    Procedure: OPEN REDUCTION AND INTERNAL FIXATION OF RIGHT LISFRANC FRACTURE DISLOCATION;  Surgeon: Golden Maki DPM;  Location: [facility];  Service: Podiatry;  Laterality: Right;   • ORIF WRIST FRACTURE Left    • OTHER SURGICAL HISTORY      gastric surgery secondary to perforated ulcer         Family History   Problem Relation Age of Onset   • Diabetes Mother    • Cancer Maternal Grandfather    • Cancer Paternal Grandfather    • Diabetes Paternal Grandfather        No Known Allergies    Social History     Socioeconomic History   • Marital status:    Tobacco Use   • Smoking status: Current Every Day Smoker     Packs/day: 1.00     Years: 19.00     Pack years: 19.00   • Smokeless tobacco: Current User     Types: Snuff   Vaping Use   • Vaping Use: Never used   Substance and Sexual Activity   • Alcohol use: Yes     Comment: socially   • Sexual activity: Yes     Partners: Female     Birth control/protection: None         Current Outpatient Medications   Medication Sig Dispense Refill   • HYDROcodone-acetaminophen (Norco) 7.5-325 MG per tablet Take 1 tablet by mouth Every 4 (Four) Hours As Needed for Moderate Pain. 30 tablet 0   • meloxicam  "(MOBIC) 15 MG tablet Take 1 tablet by mouth Daily. 30 tablet 3     No current facility-administered medications for this visit.       Review of Systems   Constitutional: Negative.    HENT: Negative.    Eyes: Negative.    Respiratory: Negative.    Cardiovascular: Negative.    Gastrointestinal: Negative.    Musculoskeletal:        Right foot soreness   Skin: Negative.    Psychiatric/Behavioral: Negative.          OBJECTIVE    /70   Pulse 116   Ht 180.3 cm (71\")   Wt 72.1 kg (159 lb)   SpO2 98%   BMI 22.18 kg/m²      Physical Exam  Vitals reviewed.   Constitutional:       General: He is not in acute distress.     Appearance: He is well-developed.   HENT:      Head: Normocephalic and atraumatic.      Nose: Nose normal.   Eyes:      Conjunctiva/sclera: Conjunctivae normal.      Pupils: Pupils are equal, round, and reactive to light.   Pulmonary:      Effort: Pulmonary effort is normal. No respiratory distress.      Breath sounds: No wheezing.   Skin:     General: Skin is warm.      Capillary Refill: Capillary refill takes less than 2 seconds.   Neurological:      Mental Status: He is alert and oriented to person, place, and time.   Psychiatric:         Behavior: Behavior normal.         Thought Content: Thought content normal.          Right Lower Extremity Exam: Incision sites are healed.  Minimal edema.  Negative Homans.  Sensation intact to light touch.  Muscle strength WNL.      Foot/Ankle Exam        Procedures    ASSESSMENT AND PLAN    Diagnoses and all orders for this visit:    1. Pain from implanted hardware, initial encounter (Primary)    Other orders  -     meloxicam (MOBIC) 15 MG tablet; Take 1 tablet by mouth Daily.  Dispense: 30 tablet; Refill: 3          -Patient doing well postoperatively.  Progress activity as tolerated.  Rx Mobic.  Recheck 4 weeks          This document has been electronically signed by Golden Maki DPM on October 27, 2021 12:12 CDT     10/27/2021  12:12 CDT    "

## 2021-11-29 ENCOUNTER — OFFICE VISIT (OUTPATIENT)
Dept: PODIATRY | Facility: CLINIC | Age: 36
End: 2021-11-29

## 2021-11-29 VITALS
OXYGEN SATURATION: 96 % | DIASTOLIC BLOOD PRESSURE: 79 MMHG | HEIGHT: 71 IN | WEIGHT: 159 LBS | SYSTOLIC BLOOD PRESSURE: 121 MMHG | HEART RATE: 73 BPM | BODY MASS INDEX: 22.26 KG/M2

## 2021-11-29 DIAGNOSIS — T85.848D PAIN FROM IMPLANTED HARDWARE, SUBSEQUENT ENCOUNTER: Primary | ICD-10-CM

## 2021-11-29 PROCEDURE — 99024 POSTOP FOLLOW-UP VISIT: CPT | Performed by: PODIATRIST

## 2021-11-29 NOTE — PROGRESS NOTES
Andres Wallace  1985  36 y.o. male     Post op of right foot hardware removal.    11/29/2021     Chief Complaint   Patient presents with   • Right Foot - Follow-up       History of Present Illness    Andres Wallace is a 36 y.o.male who presents for his fourth postoperative visit.  Patient had hardware removal on September 30.  Patient is ambulating in regular shoe pain-free.    Past Medical History:   Diagnosis Date   • Foot fracture, right          Past Surgical History:   Procedure Laterality Date   • HARDWARE REMOVAL Right 9/30/2021    Procedure: HARDWARE REMOVALRIGHT FOOT;  Surgeon: Golden Maki DPM;  Location: [facility];  Service: Podiatry;  Laterality: Right;   • ORIF FOOT FRACTURE Right 4/15/2021    Procedure: OPEN REDUCTION AND INTERNAL FIXATION OF RIGHT LISFRANC FRACTURE DISLOCATION;  Surgeon: Golden Maki DPM;  Location: [facility];  Service: Podiatry;  Laterality: Right;   • ORIF WRIST FRACTURE Left    • OTHER SURGICAL HISTORY      gastric surgery secondary to perforated ulcer         Family History   Problem Relation Age of Onset   • Diabetes Mother    • Cancer Maternal Grandfather    • Cancer Paternal Grandfather    • Diabetes Paternal Grandfather        No Known Allergies    Social History     Socioeconomic History   • Marital status:    Tobacco Use   • Smoking status: Current Every Day Smoker     Packs/day: 1.00     Years: 19.00     Pack years: 19.00   • Smokeless tobacco: Current User     Types: Snuff   Vaping Use   • Vaping Use: Never used   Substance and Sexual Activity   • Alcohol use: Yes     Comment: socially   • Sexual activity: Yes     Partners: Female     Birth control/protection: None         No current outpatient medications on file.     No current facility-administered medications for this visit.       Review of Systems   Constitutional: Negative.    HENT: Negative.    Eyes: Negative.    Respiratory: Negative.    Cardiovascular: Negative.    Gastrointestinal:  "Negative.    Musculoskeletal: Negative.    Skin: Negative.    Psychiatric/Behavioral: Negative.          OBJECTIVE    /79   Pulse 73   Ht 180.3 cm (71\")   Wt 72.1 kg (159 lb)   SpO2 96%   BMI 22.18 kg/m²      Physical Exam  Vitals reviewed.   Constitutional:       General: He is not in acute distress.     Appearance: He is well-developed.   HENT:      Head: Normocephalic and atraumatic.      Nose: Nose normal.   Eyes:      Conjunctiva/sclera: Conjunctivae normal.      Pupils: Pupils are equal, round, and reactive to light.   Pulmonary:      Effort: Pulmonary effort is normal. No respiratory distress.      Breath sounds: No wheezing.   Skin:     General: Skin is warm.      Capillary Refill: Capillary refill takes less than 2 seconds.   Neurological:      Mental Status: He is alert and oriented to person, place, and time.   Psychiatric:         Behavior: Behavior normal.         Thought Content: Thought content normal.          Right Lower Extremity Exam: Incision sites are healed.  No edema.  Negative Homans.  Sensation intact to light touch.  Muscle strength WNL.      Foot/Ankle Exam        Procedures    ASSESSMENT AND PLAN    Diagnoses and all orders for this visit:    1. Pain from implanted hardware, subsequent encounter (Primary)          -Patient doing well postoperatively.  He is discharged from care at this time.  Recheck as needed          This document has been electronically signed by Golden Maki DPM on November 29, 2021 09:40 CST     11/29/2021  09:40 CST    "

## (undated) DEVICE — COVER,MAYO STAND,STERILE: Brand: MEDLINE

## (undated) DEVICE — SPNG GZ WOVN 4X4IN 12PLY 10/BX STRL

## (undated) DEVICE — DRSNG WND GZ CURAD OIL EMULSION 3X3IN STRL

## (undated) DEVICE — STERILE POLYISOPRENE POWDER-FREE SURGICAL GLOVES WITH EMOLLIENT COATING: Brand: PROTEXIS

## (undated) DEVICE — PK POD 60

## (undated) DEVICE — GW THRD 1.25X150MM FOR 3.5 4MM CANN SCRW

## (undated) DEVICE — GLV SURG SENSICARE PI ORTHO SZ6.5 LF STRL

## (undated) DEVICE — GOWN,PREVENTION PLUS,XLNG/XXLARGE,STRL: Brand: MEDLINE

## (undated) DEVICE — POSTN ELEV LEG PROCARE FM UNIV 45DEG 31.5X10IN

## (undated) DEVICE — SUT ETHLN 3-0 FS118IN 663H

## (undated) DEVICE — SUT ETHLN 4/0 PS2 18IN 1667H

## (undated) DEVICE — GLV SURG SIGNATURE ESSENTIAL PF LTX SZ7

## (undated) DEVICE — Device

## (undated) DEVICE — SOL IRR NACL 0.9PCT BT 1000ML

## (undated) DEVICE — DISPOSABLE TOURNIQUET CUFF SINGLE BLADDER, DUAL PORT AND QUICK CONNECT CONNECTOR: Brand: COLOR CUFF

## (undated) DEVICE — GLV SURG SIGNATURE ESSENTIAL PF LTX SZ6.5

## (undated) DEVICE — PENCL ES MEGADINE EZ/CLEAN BUTN W/HOLSTR 10FT

## (undated) DEVICE — ANTIBACTERIAL UNDYED BRAIDED (POLYGLACTIN 910), SYNTHETIC ABSORBABLE SUTURE: Brand: COATED VICRYL

## (undated) DEVICE — GLV SURG SENSICARE PI ORTHO SZ7.5 LF STRL

## (undated) DEVICE — GLV SURG SENSICARE PI PF LF 7 GRN STRL

## (undated) DEVICE — NDL HYPO ECLPS SFTY 25G 1 1/2IN

## (undated) DEVICE — CVR C/ARM MINI

## (undated) DEVICE — GOWN,AURORA,NOREINF,RAGLAN,XL,STERILE: Brand: MEDLINE

## (undated) DEVICE — GLV SURG SIGNATURE ESSENTIAL PF LTX SZ6

## (undated) DEVICE — BIT DRL QC W DEPTH MARK 2X140MM